# Patient Record
Sex: MALE | Race: WHITE | Employment: OTHER | URBAN - NONMETROPOLITAN AREA
[De-identification: names, ages, dates, MRNs, and addresses within clinical notes are randomized per-mention and may not be internally consistent; named-entity substitution may affect disease eponyms.]

---

## 2018-08-31 ENCOUNTER — TELEPHONE (OUTPATIENT)
Dept: FAMILY MEDICINE CLINIC | Age: 18
End: 2018-08-31

## 2018-09-04 ENCOUNTER — OFFICE VISIT (OUTPATIENT)
Dept: FAMILY MEDICINE CLINIC | Age: 18
End: 2018-09-04
Payer: COMMERCIAL

## 2018-09-04 VITALS
TEMPERATURE: 98.4 F | RESPIRATION RATE: 16 BRPM | HEART RATE: 80 BPM | DIASTOLIC BLOOD PRESSURE: 60 MMHG | WEIGHT: 172 LBS | HEIGHT: 71 IN | SYSTOLIC BLOOD PRESSURE: 122 MMHG | BODY MASS INDEX: 24.08 KG/M2

## 2018-09-04 DIAGNOSIS — F41.0 PANIC DISORDER: ICD-10-CM

## 2018-09-04 DIAGNOSIS — F41.9 ANXIETY: Primary | ICD-10-CM

## 2018-09-04 PROCEDURE — G8420 CALC BMI NORM PARAMETERS: HCPCS | Performed by: FAMILY MEDICINE

## 2018-09-04 PROCEDURE — 4004F PT TOBACCO SCREEN RCVD TLK: CPT | Performed by: FAMILY MEDICINE

## 2018-09-04 PROCEDURE — G8427 DOCREV CUR MEDS BY ELIG CLIN: HCPCS | Performed by: FAMILY MEDICINE

## 2018-09-04 PROCEDURE — 99203 OFFICE O/P NEW LOW 30 MIN: CPT | Performed by: FAMILY MEDICINE

## 2018-09-04 RX ORDER — HYDROXYZINE 50 MG/1
TABLET, FILM COATED ORAL
Qty: 60 TABLET | Refills: 1 | Status: ON HOLD | OUTPATIENT
Start: 2018-09-04 | End: 2018-10-03 | Stop reason: HOSPADM

## 2018-09-04 RX ORDER — VENLAFAXINE HYDROCHLORIDE 75 MG/1
75 CAPSULE, EXTENDED RELEASE ORAL DAILY
Qty: 30 CAPSULE | Refills: 3 | Status: ON HOLD | OUTPATIENT
Start: 2018-09-04 | End: 2018-10-03 | Stop reason: HOSPADM

## 2018-09-04 ASSESSMENT — PATIENT HEALTH QUESTIONNAIRE - PHQ9
SUM OF ALL RESPONSES TO PHQ9 QUESTIONS 1 & 2: 0
2. FEELING DOWN, DEPRESSED OR HOPELESS: 0
1. LITTLE INTEREST OR PLEASURE IN DOING THINGS: 0
SUM OF ALL RESPONSES TO PHQ QUESTIONS 1-9: 0
SUM OF ALL RESPONSES TO PHQ QUESTIONS 1-9: 0

## 2018-09-04 NOTE — PATIENT INSTRUCTIONS
usually wake up shortly afterward. · Always give yourself time to return to full alertness before you drive a car or do anything that might cause an accident if you are not fully alert. Never play a relaxation tape while you drive a car. When should you call for help? Call 911 anytime you think you may need emergency care. For example, call if:    · You feel you cannot stop from hurting yourself or someone else.   Rob Chandra the numbers for these national suicide hotlines: 5-508-916-TALK (0-557.944.9529) and 8-524-MLXSWYE (0-788.575.1476). If you or someone you know talks about suicide or feeling hopeless, get help right away.   Watch closely for changes in your health, and be sure to contact your doctor if:    · You have anxiety or fear that affects your life.     · You have symptoms of anxiety that are new or different from those you had before. Where can you learn more? Go to https://Network Vision.Synchroneuron. org and sign in to your UserZoom account. Enter P754 in the NoteVault box to learn more about \"Anxiety Disorder: Care Instructions. \"     If you do not have an account, please click on the \"Sign Up Now\" link. Current as of: December 7, 2017  Content Version: 11.7  © 6296-4418 Healthwise, Incorporated. Care instructions adapted under license by [facility] ([facility]). If you have questions about a medical condition or this instruction, always ask your healthcare professional. [place] [address] any warranty or liability for your use of this information. Patient Education        Panic Attacks: Care Instructions  Your Care Instructions    During a panic attack, you may have a feeling of intense fear or terror, trouble breathing, chest pain or tightness, heartbeat changes, dizziness, sweating, and shaking. A panic attack starts suddenly and usually lasts from 5 to 20 minutes but may last even longer. You have the most anxiety about 10 minutes after the attack starts.  An attack can begin with a stressful event, or it can happen without a cause. Although panic attacks can cause scary symptoms, you can learn to manage them with self-care, counseling, and medicine. Follow-up care is a key part of your treatment and safety. Be sure to make and go to all appointments, and call your doctor if you are having problems. It's also a good idea to know your test results and keep a list of the medicines you take. How can you care for yourself at home? · Take your medicine exactly as directed. Call your doctor if you think you are having a problem with your medicine. · Go to your counseling sessions and follow-up appointments. · Recognize and accept your anxiety. Then, when you are in a situation that makes you anxious, say to yourself, \"This is not an emergency. I feel uncomfortable, but I am not in danger. I can keep going even if I feel anxious. \"  · Be kind to your body:  ¨ Relieve tension with exercise or a massage. ¨ Get enough rest.  ¨ Avoid alcohol, caffeine, nicotine, and illegal drugs. They can increase your anxiety level, cause sleep problems, or trigger a panic attack. ¨ Learn and do relaxation techniques. See below for more about these techniques. · Engage your mind. Get out and do something you enjoy. Go to a funny movie, or take a walk or hike. Plan your day. Having too much or too little to do can make you anxious. · Keep a record of your symptoms. Discuss your fears with a good friend or family member, or join a support group for people with similar problems. Talking to others sometimes relieves stress. · Get involved in social groups, or volunteer to help others. Being alone sometimes makes things seem worse than they are. · Get at least 30 minutes of exercise on most days of the week to relieve stress. Walking is a good choice. You also may want to do other activities, such as running, swimming, cycling, or playing tennis or team sports.   Relaxation techniques  Do relaxation or different anxiety.     · You are not getting better as expected. Where can you learn more? Go to https://Boondpepiceweb.Vivid [email or web address] org and sign in to your pMDsoft account. Enter H601 in the Skiipi box to learn more about \"Panic Attacks: Care Instructions. \"     If you do not have an account, please click on the \"Sign Up Now\" link. Current as of: December 7, 2017  Content Version: 11.7  © 3746-3623 [employer], Incorporated. Care instructions adapted under license by [facility] [address] If you have questions about a medical condition or this instruction, always ask your healthcare professional. [address] any warranty or liability for your use of this information.

## 2018-09-04 NOTE — PROGRESS NOTES
Chief Complaint   Patient presents with   Sheela Copeland Doctor     former pt Dr Patricia Velasquez Other     Hx paniac  attacks   more frequent as of late. History obtained from the patient. SUBJECTIVE:  David Hampton is a 19 y.o. male that presents today for establishing care with new physician, etc. New patient, [facility] [address]. Anxiety: HPI: a year ago was dx with depression, anxiety and panic d/o. Was treated with zoloft, but made him groggy, so he stopped it after 4 months. Had been doing well for the most part until recently. 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 Denies any depressive sxs. But feeling more anxious of late and having on and off panic attacks. The last wk it's been nightly. Having trouble managing. Was seen at [facility] at one point, and was tried on vistaril, but that didn't really help much.     zolfot stopped in MAY  Had some SI before in [facility] of 2018, none since then.      Inciting events or triggers for anxiety - life/stress  Frequency of anxiety - daily  Panic attacks?: as above  Sleep Disturbances? : yes  Impaired concentration?: denies  Substance abuse?: denies  Suicidal/Homicidal Ideation?: denies       Age/Gender Health Maintenance    Lipid - age 26  DM Screen - age 26  Colon Cancer Screening - age 46  Lung Cancer Screening (Age 54 to [de-identified] with 27 pack year hx, current smoker or quit within past 13 years) - age 54 if meets criteria    Tetanus - UTD June 2011  Influenza Vaccine - candidate FALL 2018  Pneumonia Vaccine - age 70  Zostavax - age 46     PSA testing discussion - age 52  AAA Screening - age 70 if smoked    Falls screening - n/a      Current Outpatient Prescriptions   Medication Sig Dispense Refill    venlafaxine (EFFEXOR XR) 75 MG extended release capsule Take 1 capsule by mouth daily 30 capsule 3    hydrOXYzine (ATARAX) 50 MG tablet Take 1 to 2 tablets by mouth every 6 hours as needed for anxiety 60 tablet 1    acetaminophen (TYLENOL) 500 MG tablet Take 1,000 mg by mouth every 6 hours as needed.  multivitamin (THERAGRAN) per tablet Take 1 tablet by mouth daily. No current facility-administered medications for this visit. Orders Placed This Encounter   Medications    venlafaxine (EFFEXOR XR) 75 MG extended release capsule     Sig: Take 1 capsule by mouth daily     Dispense:  30 capsule     Refill:  3    hydrOXYzine (ATARAX) 50 MG tablet     Sig: Take 1 to 2 tablets by mouth every 6 hours as needed for anxiety     Dispense:  60 tablet     Refill:  1         All medications reviewed and reconciled, including OTC and herbal medications. Updated list given to patient. Patient Active Problem List    Diagnosis Date Noted    Anxiety 09/04/2018    Panic disorder 09/04/2018    History of depression        Past Medical History:   Diagnosis Date    Anxiety     History of depression          Past Surgical History:   Procedure Laterality Date    HARDWARE REMOVAL Right 11/27/2013    Knee    TIBIA FRACTURE SURGERY  3/28/2013         No Known Allergies      Social History     Social History    Marital status: Single     Spouse name: N/A    Number of children: N/A    Years of education: N/A     Occupational History    Not on file.      Social History Main Topics    Smoking status: Current Every Day Smoker     Packs/day: 0.25     Years: 2.00     Types: Cigarettes    Smokeless tobacco: Never Used    Alcohol use Yes      Comment: 8  weekly     Drug use: No      Comment: smoked  pot in  past  but quiet     Sexual activity: Yes     Other Topics Concern    Not on file     Social History Narrative    No narrative on file         Family History   Problem Relation Age of Onset    Diabetes Father         type 1    Cancer Father         Stomach cancer as an infant   [facility] Breast [facility] Mother 62    Other Mother         RAMA     Colon Cancer Neg Hx     Prostate Cancer Neg Hx          I

## 2018-09-06 ENCOUNTER — TELEPHONE (OUTPATIENT)
Dept: FAMILY MEDICINE CLINIC | Age: 18
End: 2018-09-06

## 2018-09-06 NOTE — TELEPHONE ENCOUNTER
Patients mom calling, she is on hipaa. Patient had intercourse with someone who has herpes. He does not have any symptoms. Can he be tested?

## 2018-09-07 NOTE — TELEPHONE ENCOUNTER
Khurram Schilling, if he's not having any symptoms, then I don't think I'd recommend any testing for genital herpes. But would recommend routine STD screening for other diseases that could go with genital herpes. If ok with that, will order labs. Let me know if questions, thanks!

## 2018-09-07 NOTE — TELEPHONE ENCOUNTER
Spoke to pt's mom and she said that she will discuss this with Ronny Bob and get back with us for an answer.

## 2018-09-29 ENCOUNTER — HOSPITAL ENCOUNTER (INPATIENT)
Age: 18
LOS: 4 days | Discharge: HOME | DRG: 885 | End: 2018-10-03
Attending: FAMILY MEDICINE | Admitting: PSYCHIATRY & NEUROLOGY
Payer: COMMERCIAL

## 2018-09-29 DIAGNOSIS — F19.94 SUBSTANCE INDUCED MOOD DISORDER (HCC): Primary | ICD-10-CM

## 2018-09-29 LAB
ACETAMINOPHEN LEVEL: < 5 UG/ML (ref 0–20)
ALBUMIN SERPL-MCNC: 5.7 G/DL (ref 3.5–5.1)
ALP BLD-CCNC: 74 U/L (ref 38–126)
ALT SERPL-CCNC: 35 U/L (ref 11–66)
AMPHETAMINE+METHAMPHETAMINE URINE SCREEN: POSITIVE
ANION GAP SERPL CALCULATED.3IONS-SCNC: 16 MEQ/L (ref 8–16)
AST SERPL-CCNC: 28 U/L (ref 5–40)
BARBITURATE QUANTITATIVE URINE: NEGATIVE
BASOPHILS # BLD: 0.7 %
BASOPHILS ABSOLUTE: 0 THOU/MM3 (ref 0–0.1)
BENZODIAZEPINE QUANTITATIVE URINE: NEGATIVE
BILIRUB SERPL-MCNC: 2 MG/DL (ref 0.3–1.2)
BILIRUBIN DIRECT: 0.3 MG/DL (ref 0–0.3)
BILIRUBIN URINE: NEGATIVE
BLOOD, URINE: NEGATIVE
BUN BLDV-MCNC: 21 MG/DL (ref 7–22)
CALCIUM SERPL-MCNC: 10.4 MG/DL (ref 8.5–10.5)
CANNABINOID QUANTITATIVE URINE: NEGATIVE
CHARACTER, URINE: CLEAR
CHLORIDE BLD-SCNC: 97 MEQ/L (ref 98–111)
CO2: 26 MEQ/L (ref 23–33)
COCAINE METABOLITE QUANTITATIVE URINE: NEGATIVE
COLOR: YELLOW
CREAT SERPL-MCNC: 1 MG/DL (ref 0.4–1.2)
EOSINOPHIL # BLD: 2.7 %
EOSINOPHILS ABSOLUTE: 0.2 THOU/MM3 (ref 0–0.4)
ERYTHROCYTE [DISTWIDTH] IN BLOOD BY AUTOMATED COUNT: 12.8 % (ref 11.5–14.5)
ERYTHROCYTE [DISTWIDTH] IN BLOOD BY AUTOMATED COUNT: 41.1 FL (ref 35–45)
ETHYL ALCOHOL, SERUM: < 0.01 %
GFR SERPL CREATININE-BSD FRML MDRD: > 90 ML/MIN/1.73M2
GLUCOSE BLD-MCNC: 88 MG/DL (ref 70–108)
GLUCOSE URINE: NEGATIVE MG/DL
HCT VFR BLD CALC: 49.1 % (ref 42–52)
HEMOGLOBIN: 17.6 GM/DL (ref 14–18)
IMMATURE GRANS (ABS): 0.02 THOU/MM3 (ref 0–0.07)
IMMATURE GRANULOCYTES: 0.4 %
KETONES, URINE: 15
LEUKOCYTE ESTERASE, URINE: NEGATIVE
LYMPHOCYTES # BLD: 33.3 %
LYMPHOCYTES ABSOLUTE: 1.9 THOU/MM3 (ref 1–4.8)
MCH RBC QN AUTO: 31.4 PG (ref 26–33)
MCHC RBC AUTO-ENTMCNC: 35.8 GM/DL (ref 32.2–35.5)
MCV RBC AUTO: 87.7 FL (ref 80–94)
MONOCYTES # BLD: 9.7 %
MONOCYTES ABSOLUTE: 0.5 THOU/MM3 (ref 0.4–1.3)
NITRITE, URINE: NEGATIVE
NUCLEATED RED BLOOD CELLS: 0 /100 WBC
OPIATES, URINE: NEGATIVE
OSMOLALITY CALCULATION: 279.9 MOSMOL/KG (ref 275–300)
OXYCODONE: NEGATIVE
PH UA: 5.5
PHENCYCLIDINE QUANTITATIVE URINE: NEGATIVE
PLATELET # BLD: 265 THOU/MM3 (ref 130–400)
PMV BLD AUTO: 10 FL (ref 9.4–12.4)
POTASSIUM SERPL-SCNC: 4.8 MEQ/L (ref 3.5–5.2)
PROTEIN UA: NEGATIVE
RBC # BLD: 5.6 MILL/MM3 (ref 4.7–6.1)
SALICYLATE, SERUM: < 0.3 MG/DL (ref 2–10)
SEG NEUTROPHILS: 53.2 %
SEGMENTED NEUTROPHILS ABSOLUTE COUNT: 3 THOU/MM3 (ref 1.8–7.7)
SODIUM BLD-SCNC: 139 MEQ/L (ref 135–145)
SPECIFIC GRAVITY, URINE: 1.02 (ref 1–1.03)
TOTAL PROTEIN: 7.9 G/DL (ref 6.1–8)
TSH SERPL DL<=0.05 MIU/L-ACNC: 0.8 UIU/ML (ref 0.4–4.2)
UROBILINOGEN, URINE: 0.2 EU/DL
WBC # BLD: 5.6 THOU/MM3 (ref 4.8–10.8)

## 2018-09-29 PROCEDURE — 80053 COMPREHEN METABOLIC PANEL: CPT

## 2018-09-29 PROCEDURE — 82248 BILIRUBIN DIRECT: CPT

## 2018-09-29 PROCEDURE — 81003 URINALYSIS AUTO W/O SCOPE: CPT

## 2018-09-29 PROCEDURE — 99284 EMERGENCY DEPT VISIT MOD MDM: CPT

## 2018-09-29 PROCEDURE — 6370000000 HC RX 637 (ALT 250 FOR IP): Performed by: PSYCHIATRY & NEUROLOGY

## 2018-09-29 PROCEDURE — 80307 DRUG TEST PRSMV CHEM ANLYZR: CPT

## 2018-09-29 PROCEDURE — G0480 DRUG TEST DEF 1-7 CLASSES: HCPCS

## 2018-09-29 PROCEDURE — 85025 COMPLETE CBC W/AUTO DIFF WBC: CPT

## 2018-09-29 PROCEDURE — 1240000000 HC EMOTIONAL WELLNESS R&B

## 2018-09-29 PROCEDURE — 84443 ASSAY THYROID STIM HORMONE: CPT

## 2018-09-29 PROCEDURE — 36415 COLL VENOUS BLD VENIPUNCTURE: CPT

## 2018-09-29 RX ORDER — NICOTINE 21 MG/24HR
1 PATCH, TRANSDERMAL 24 HOURS TRANSDERMAL DAILY
Status: DISCONTINUED | OUTPATIENT
Start: 2018-09-30 | End: 2018-09-30

## 2018-09-29 RX ORDER — ACETAMINOPHEN 325 MG/1
650 TABLET ORAL EVERY 4 HOURS PRN
Status: DISCONTINUED | OUTPATIENT
Start: 2018-09-29 | End: 2018-10-03 | Stop reason: HOSPADM

## 2018-09-29 RX ORDER — MAGNESIUM HYDROXIDE/ALUMINUM HYDROXICE/SIMETHICONE 120; 1200; 1200 MG/30ML; MG/30ML; MG/30ML
30 SUSPENSION ORAL PRN
Status: DISCONTINUED | OUTPATIENT
Start: 2018-09-29 | End: 2018-10-03 | Stop reason: HOSPADM

## 2018-09-29 RX ORDER — TRAZODONE HYDROCHLORIDE 50 MG/1
50 TABLET ORAL NIGHTLY PRN
Status: DISCONTINUED | OUTPATIENT
Start: 2018-09-29 | End: 2018-10-03 | Stop reason: HOSPADM

## 2018-09-29 RX ORDER — IBUPROFEN 400 MG/1
400 TABLET ORAL EVERY 6 HOURS PRN
Status: DISCONTINUED | OUTPATIENT
Start: 2018-09-29 | End: 2018-10-03 | Stop reason: HOSPADM

## 2018-09-29 RX ORDER — HYDROXYZINE PAMOATE 25 MG/1
25 CAPSULE ORAL 3 TIMES DAILY PRN
Status: DISCONTINUED | OUTPATIENT
Start: 2018-09-29 | End: 2018-10-03 | Stop reason: HOSPADM

## 2018-09-29 RX ORDER — TRAZODONE HYDROCHLORIDE 50 MG/1
50 TABLET ORAL NIGHTLY PRN
Status: DISCONTINUED | OUTPATIENT
Start: 2018-09-30 | End: 2018-09-29

## 2018-09-29 RX ADMIN — TRAZODONE HYDROCHLORIDE 50 MG: 50 TABLET ORAL at 23:16

## 2018-09-29 RX ADMIN — HYDROXYZINE PAMOATE 25 MG: 25 CAPSULE ORAL at 23:16

## 2018-09-29 ASSESSMENT — ENCOUNTER SYMPTOMS
DIARRHEA: 0
COLOR CHANGE: 0
ABDOMINAL PAIN: 0
VOMITING: 0
BACK PAIN: 0
SORE THROAT: 0
COUGH: 0

## 2018-09-29 ASSESSMENT — SLEEP AND FATIGUE QUESTIONNAIRES
DIFFICULTY STAYING ASLEEP: YES
DO YOU HAVE DIFFICULTY SLEEPING: YES
SLEEP PATTERN: INSOMNIA;DIFFICULTY FALLING ASLEEP;DISTURBED/INTERRUPTED SLEEP
AVERAGE NUMBER OF SLEEP HOURS: 6
RESTFUL SLEEP: NO
DIFFICULTY ARISING: YES
SLEEP PATTERN: INSOMNIA;DIFFICULTY FALLING ASLEEP;DISTURBED/INTERRUPTED SLEEP
DO YOU USE A SLEEP AID: NO
AVERAGE NUMBER OF SLEEP HOURS: 6
DO YOU HAVE DIFFICULTY SLEEPING: YES
DO YOU USE A SLEEP AID: NO
DIFFICULTY STAYING ASLEEP: YES
DIFFICULTY ARISING: YES
DIFFICULTY FALLING ASLEEP: YES
DIFFICULTY FALLING ASLEEP: YES
RESTFUL SLEEP: NO

## 2018-09-29 ASSESSMENT — LIFESTYLE VARIABLES
HISTORY_ALCOHOL_USE: YES
HISTORY_ALCOHOL_USE: YES

## 2018-09-29 ASSESSMENT — PAIN SCALES - GENERAL: PAINLEVEL_OUTOF10: 0

## 2018-09-29 ASSESSMENT — PATIENT HEALTH QUESTIONNAIRE - PHQ9
SUM OF ALL RESPONSES TO PHQ QUESTIONS 1-9: 17
SUM OF ALL RESPONSES TO PHQ QUESTIONS 1-9: 17

## 2018-09-29 NOTE — ED PROVIDER NOTES
[facility]  eMERGENCY dEPARTMENT eNCOUnter          CHIEF COMPLAINT       Chief Complaint   Patient presents with    Suicidal    Psychiatric Evaluation       Nurses Notes reviewed and I agree except as noted in the HPI. HISTORY OF PRESENT ILLNESS    Danny Pearl is a 19 y.o. male who presents to the Emergency Department for the evaluation of Mental health. Patient states that for the past 3 months he has been \"testing the [place] to see if I am who they say that I am.\"  He states that [facility] world thinks Anthony. \"  He states he saw Dr. Dunaway Or a few weeks ago but was not honest with him about his symptoms. He has not been taking the prescribed Effexor but has tried the Atarax for his anxiety. He reports frequent drug use. He uses vitamins on a regular basis, last use yesterday morning. He notices his psychiatric symptoms persist even if he stops using. He does note that he has used anything he can get his hands on in the past.  He reports suicidal attempt  By slitting his wrist 2-3 weeks ago but couldn't tolerate the pain. He's been having ideations for a \"very long time. \"He denies any homicidal ideations. He does report that he sees the slowest soles and that when he looks in the mirror at night a dark figure with one eye approaches him and he believes it is [facility]. Denies any physical concerns or complaints today, other than occasional fleeting pains that occur and are caused by others when they become angry with the things that he says. The HPI was provided by the patient. REVIEW OF SYSTEMS     Review of Systems   Constitutional: Negative for chills and fever. HENT: Negative for sore throat. Eyes: Negative for visual disturbance. Respiratory: Negative for cough. Cardiovascular: Negative for chest pain. Gastrointestinal: Negative for abdominal pain, diarrhea and vomiting. Genitourinary: Negative for dysuria.    Musculoskeletal: Negative for back

## 2018-09-30 LAB
EKG ATRIAL RATE: 77 BPM
EKG P AXIS: 62 DEGREES
EKG P-R INTERVAL: 150 MS
EKG Q-T INTERVAL: 354 MS
EKG QRS DURATION: 106 MS
EKG QTC CALCULATION (BAZETT): 400 MS
EKG R AXIS: 87 DEGREES
EKG T AXIS: 62 DEGREES
EKG VENTRICULAR RATE: 77 BPM

## 2018-09-30 PROCEDURE — 6370000000 HC RX 637 (ALT 250 FOR IP): Performed by: NURSE PRACTITIONER

## 2018-09-30 PROCEDURE — 93010 ELECTROCARDIOGRAM REPORT: CPT | Performed by: INTERNAL MEDICINE

## 2018-09-30 PROCEDURE — 1240000000 HC EMOTIONAL WELLNESS R&B

## 2018-09-30 PROCEDURE — 93005 ELECTROCARDIOGRAM TRACING: CPT | Performed by: NURSE PRACTITIONER

## 2018-09-30 PROCEDURE — 6370000000 HC RX 637 (ALT 250 FOR IP): Performed by: PSYCHIATRY & NEUROLOGY

## 2018-09-30 PROCEDURE — 90792 PSYCH DIAG EVAL W/MED SRVCS: CPT | Performed by: NURSE PRACTITIONER

## 2018-09-30 RX ORDER — NICOTINE 21 MG/24HR
1 PATCH, TRANSDERMAL 24 HOURS TRANSDERMAL DAILY
Status: DISCONTINUED | OUTPATIENT
Start: 2018-09-30 | End: 2018-10-03 | Stop reason: HOSPADM

## 2018-09-30 RX ORDER — CITALOPRAM 20 MG/1
20 TABLET ORAL DAILY
Status: DISCONTINUED | OUTPATIENT
Start: 2018-09-30 | End: 2018-09-30

## 2018-09-30 RX ORDER — DIVALPROEX SODIUM 250 MG/1
250 TABLET, EXTENDED RELEASE ORAL DAILY
Status: DISCONTINUED | OUTPATIENT
Start: 2018-09-30 | End: 2018-10-02

## 2018-09-30 RX ORDER — DULOXETIN HYDROCHLORIDE 20 MG/1
40 CAPSULE, DELAYED RELEASE ORAL DAILY
Status: DISCONTINUED | OUTPATIENT
Start: 2018-09-30 | End: 2018-10-02

## 2018-09-30 RX ORDER — DIVALPROEX SODIUM 500 MG/1
500 TABLET, EXTENDED RELEASE ORAL NIGHTLY
Status: DISCONTINUED | OUTPATIENT
Start: 2018-09-30 | End: 2018-09-30

## 2018-09-30 RX ORDER — QUETIAPINE FUMARATE 25 MG/1
50 TABLET, FILM COATED ORAL 2 TIMES DAILY
Status: DISCONTINUED | OUTPATIENT
Start: 2018-09-30 | End: 2018-10-01

## 2018-09-30 RX ADMIN — DULOXETINE HYDROCHLORIDE 40 MG: 20 CAPSULE, DELAYED RELEASE ORAL at 10:41

## 2018-09-30 RX ADMIN — QUETIAPINE FUMARATE 50 MG: 25 TABLET ORAL at 20:36

## 2018-09-30 RX ADMIN — DIVALPROEX SODIUM 250 MG: 250 TABLET, EXTENDED RELEASE ORAL at 09:56

## 2018-09-30 RX ADMIN — QUETIAPINE FUMARATE 50 MG: 25 TABLET ORAL at 10:41

## 2018-09-30 RX ADMIN — HYDROXYZINE PAMOATE 25 MG: 25 CAPSULE ORAL at 20:37

## 2018-09-30 ASSESSMENT — PAIN SCALES - GENERAL: PAINLEVEL_OUTOF10: 0

## 2018-09-30 NOTE — PROGRESS NOTES
Behavioral Health   Admission Note     Admission Type:   Admission Type: Involuntary    Reason for admission:  Reason for Admission: suicidal ideations     PATIENT STRENGTHS:  Strengths: Communication, No significant Physical Illness, Positive Support    Patient Strengths and Limitations:  Limitations: Difficulty problem solving/relies on others to help solve problems, Inappropriate/potentially harmful leisure interests    Addictive Behavior:   Addictive Behavior  In the past 3 months, have you felt or has someone told you that you have a problem with:  : Eating (too much/too little), Sex/Pornography, Internet Use  Do you have a history of Chemical Use?: No  Do you have a history of Alcohol Use?: Yes  Do you have a history of Street Drug Abuse?: Yes  Histroy of Prescripton Drug Abuse?: Yes    Medical Problems:   Past Medical History:   Diagnosis Date    Anxiety     History of depression        Status EXAM:  Status and Exam  Normal: No  Facial Expression: Exaggerated, Brightened  Affect: Stable  Level of Consciousness: Alert  Mood:Normal: No  Mood: Anxious, Euphoric  Motor Activity:Normal: Yes  Motor Activity: Increased  Interview Behavior: Cooperative  Preception: Notrees to Person, Notrees to Time, Notrees to Place, Notrees to Situation  Attention:Normal: No  Attention: Distractible, Unable to Concentrate  Thought Processes: Circumstantial  Thought Content:Normal: No  Thought Content: Delusions, Preoccupations  Hallucinations: Visual (Comment), Auditory (Comment) (see's body's and souls when its dark .  multiple voices at a time but states tunes them out when he wants )  Delusions: Yes  Delusions: Grandeur  Memory:Normal: No  Memory: Poor Recent  Insight and Judgment: No  Insight and Judgment: Poor Judgment, Poor Insight  Present Suicidal Ideation: No  Present Homicidal Ideation: No    Pt admitted with followings belongings:  Dentures: None  Vision - Corrective Lenses: Glasses  Hearing Aid: None  Jewelry: None  Body Piercings Removed: N/A  Clothing: Footwear, Pants, Shirt, Socks, Undergarments (Comment), Other (Comment) (belt)  Were All Patient Medications Collected?: Not Applicable  Other Valuables: Cell phone, Money (Comment), Wallet, Other (Comment) ($3.00; ID and muliple cards in wallet;)     Admission order obtained YES. Valuables placed in safe in security envelope, number:  [id number]. Patient oriented to surroundings and program expectations and copy of patient rights given. Received admission packet:  yes. Consents reviewed, signed yes. Patient verbalize understanding:  yes. Patient education on precautions: yes           Provided pt with [employer] handout entitled \"Quitting Smoking. \"  Reviewed handout with pt addressing dangers of smoking, developing coping skills, and providing basic information about quitting. Pt response to counseling:  Does not verbalize interest at this time. Pt arrived on unit via campus police and ED staff. Alert and oriented x 4. States he is here because he has been having suicidal ideations from withdrawaling from meth. Pt states he uses any drugs he can get. Denies depression. Complained of anxiety. Not sleeping or eating x 3 days. States he is having auditory hallucinations states hears multiple voices and they are just holding a conversation. States he can turn auditory hallucinations on and off when needed. State he is having visual hallucinations see's body and souls when he is in the dark. States he can see different dimensions of the world. Denies suicidal ideations while on unit. Denies homicidal ideations. States he was prescribed Effexor and Atarax by Dr Rj Porter but did not start the Effexor.                Tushar Segura, RN

## 2018-09-30 NOTE — PROGRESS NOTES
[facility] Biopsychosocial Assessment    Current Level of Psychosocial Functioning     Independent xxx  Dependent    Minimal Assist     Comments:      Psychosocial High Risk Factors (check all that apply)    Unable to obtain meds   Chronic illness/pain    Substance abuse xxx  Lack of Family Support   Financial stress   Isolation   Inadequate Community Resources  Suicide attempt(s) xxx  Not taking medications xxx  Victim of crime   Developmental Delay  Unable to manage personal needs    Age 72 or older   Homeless  No transportation   Readmission within 30 days   Unemployment  Traumatic Event    Comments:   Sexual Orientation:      Patient Strengths: strong family support    Patient Barriers: substance abuse    Plan of Care     medication management, group/individual therapies, family meetings, psycho -education, treatment team meetings to assist with stabilization    Initial Discharge Plan:  Patient will resume care with [facility] and return to his home upon discharge. Clinical Summary:  Patient is a 19year old male who came to the ED due to suicidal thinking. Patient has a history of depression, but is not currently taking medication prescribed. Patient reports medication has not been helpful. Patient reports substance abuse to self-medicate including methamphetamines, marijuana and \"everything\". 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

## 2018-09-30 NOTE — H&P
Psychiatry H&P             6-              CC: Bipolar II D/O; Polysubstance abuse: Substance induced psychosis     HPI:  Patient is a 19 y.o., single,  male, that currently resides with his parents. Patient was brought to the ED by his mother for evaluation of suicidal thoughts. Patient reports that he feels that it was his \"psychosis\" that caused him to have suicidal thoughts and he also has \"drugs in [his] life. \"  Patient feels that it was because of the drugs that he had a \"psychotic episode. \"  Patient reports that his \"psychotic episode\" consisted of feelings of paranoia and he reports that he usually feels like he is the devil. He stated that these episodes have started when he started using amphetamines. Patient reports that he didn't eat or drink the past three days and has not had any sleep until last night. He had not slept in 36 hours. He reports that he got about 10 hours of sleep last night. He last used amphetamines on Friday morning (crystal meth). He also reports that he has not been taking his medications. Patient reports that he started having the suicidal thoughts today. Patient reports that he had a plan to \"just do it wherever. However, the easiest way is to kill myself. \"  Patient denies having these thoughts now. Patient reports that he has the suicidal thoughts whenever he uses drugs. Patient reports poor sleep and at times sleeping too much. He also reports problems with isolating himself from other people. He has not been taking his medications for the last 3 days.      Patient is alert and is oriented x4. Patient denies any hallucinations currently, but does report feeling that he is the devil at times. Patient speech is clear and of normal volume and aric. Patient eye contact is good. Patient's thoughts are circumstantial in nature. Upon admission to E4 Psychiatric Unit:  Manda Mood reports having mood swings and depression.  Reports had some psychosis but believes it was due to drug use. Denies feelings of harm towards self or others. Reports desming with mood swings and depression for years off and on. Reports had issues with sleep but verified slept 6 hours continuous. Reports appetite is good. Reports drug use with cannabis, Meth and Hydrocodone. Labs reviewed drawn 9-29-18 reflect no critical abnormals. Urine tox is positive for meth. Receptive for trial of Depakote and Celexa. Past Medical Hx:  See ED note    Past Psychiatric Hx:  Denies any past psychiatric hospitalizations. [facility]. Reports being seen by Carey Kam CNP at [facility]. Past psych meds. Zoloft, Vistaril. Rxed Effexor but has not has not taken . Family Hx:  Reports mother has depression. Believes father has alcoholism. Social Hx:  TOBACCO: Reports being 10 cigarettes a day smoker. ETOH:  Reports drinks 3 12 oz beers weekly. DRUGS: Reports use of meth uses once weekly. Reports cannabis use daily. Reports last used Hydrocodone 2 weeks ago. MARITAL STATUS: Never . 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 LIVING SITUATION: Lives with parents in [place], [place].  Denies having any children   LEGAL: Reports one arrest at 10years old for possession of cannabis    MSE:  Level of consciousness: Alert  Appearance: in chair and fair grooming   Behavior/Motor: no abnormalities noted   Attitude toward examiner: cooperative   Speech: Normal volume, goal directed, NRR  Mood: Euthymic  Affect: Reactive  Thought processes: Linear and goal directed   Suicidal Ideation: Denies suicidal ideations  Homicidal ideation: Denies homicidal ideations  Delusions: No evidence of delusions is observed  Perceptual Disturbance: Denies AH/VH;  No evidence of psychosis is observed. Cognition: Oriented to person, place, time and situation   Concentration fair   Memory intact   Insight: Limited  Judgment: Limited    ROS:  Constitutional: Negative for fever, chills and fatigue. HENT: Negative for ear pain, congestion, rhinorrhea and neck pain. Eyes: Negative for pain and discharge. Respiratory: Negative for cough, chest tightness and wheezing. Cardiovascular: Negative for chest pain, palpitations and leg swelling. Gastrointestinal: Negative for nausea, vomiting and diarrhea. Genitourinary: Negative for dysuria and frequency. Musculoskeletal: Negative for myalgias, back pain and arthralgias. Skin: Negative for rash. Neurological: Negative for dizziness, weakness and headaches. Impression:  MDD with psychotic features  Bipolar II D/O   Polysubstance Abuse  Substance induced psychosis    Plan:  Admit to [facility] psychiatric unit for symptom stabilization and medication management. Introduce to unit milieu, groups and individual therapies. Start Celexa 20mg po q am  Depkaote ER  250mg po q am and 500mg po q hs  EKG to be done today        [employer] Certification     Admission Day 1  I certify that this patient's inpatient psychiatric hospital admission is medically necessary for:    (1) treatment which could reasonably be expected to improve this patient's condition, or    (2) diagnostic study or its equivalent. Physicians Signature: Electronically signed by Gilda Estevez CNP 2-  I assessed this patient and reviewed the case and plan of care with Gilda Estevez CNP.   I have reviewed the above documentation and I agree with the findings and treatment plan as written      I saw this patient today with the treatment team on the unit  Present:  , Dr. Brendon Dickson  Objective:  Treatment goals were discussed  Treatment options including medications and therapy session were discussed with the patient    Patientagreed and

## 2018-10-01 PROCEDURE — 6370000000 HC RX 637 (ALT 250 FOR IP): Performed by: PSYCHIATRY & NEUROLOGY

## 2018-10-01 PROCEDURE — 6370000000 HC RX 637 (ALT 250 FOR IP): Performed by: NURSE PRACTITIONER

## 2018-10-01 PROCEDURE — 1240000000 HC EMOTIONAL WELLNESS R&B

## 2018-10-01 PROCEDURE — 99232 SBSQ HOSP IP/OBS MODERATE 35: CPT | Performed by: PSYCHIATRY & NEUROLOGY

## 2018-10-01 RX ORDER — QUETIAPINE FUMARATE 25 MG/1
50 TABLET, FILM COATED ORAL NIGHTLY
Status: DISCONTINUED | OUTPATIENT
Start: 2018-10-01 | End: 2018-10-02

## 2018-10-01 RX ORDER — QUETIAPINE FUMARATE 25 MG/1
25 TABLET, FILM COATED ORAL ONCE
Status: COMPLETED | OUTPATIENT
Start: 2018-10-01 | End: 2018-10-01

## 2018-10-01 RX ADMIN — DULOXETINE HYDROCHLORIDE 40 MG: 20 CAPSULE, DELAYED RELEASE ORAL at 08:20

## 2018-10-01 RX ADMIN — DIVALPROEX SODIUM 250 MG: 250 TABLET, EXTENDED RELEASE ORAL at 08:20

## 2018-10-01 RX ADMIN — QUETIAPINE FUMARATE 50 MG: 25 TABLET ORAL at 20:27

## 2018-10-01 RX ADMIN — QUETIAPINE FUMARATE 25 MG: 25 TABLET ORAL at 15:55

## 2018-10-01 RX ADMIN — HYDROXYZINE PAMOATE 25 MG: 25 CAPSULE ORAL at 15:05

## 2018-10-01 ASSESSMENT — PAIN SCALES - GENERAL: PAINLEVEL_OUTOF10: 0

## 2018-10-01 NOTE — PROGRESS NOTES
Nutrition Assessment    Type and Reason for Visit: Initial, Positive Nutrition Screen (weight loss, poor appetite)    Malnutrition Assessment:  · Malnutrition Status: No malnutrition    Nutrition Diagnosis:   · Problem: No nutrition diagnosis at this time    Nutrition Assessment:  · Subjective Assessment: Patient admit with suicidal ideation, history of polysubtance abuse. Patient seen- reports having good appetite now, states did not eat anything for 3 days prior to admission due to drug use, report since being in hospital has been eating well, denies nausea or abdominal pain, stools have been regular. Note intake of % of meals on general diet. Reports usual weight 165# but varies some. Current weight 164# (9/29/18, actual) with BMI: 23. Discussed ONS available- patient declines at this time. Nutrition Risk Level   Risk Level: Low    Nutrition Intervention  Food and/or Delivery: Continue current diet  Nutrition Education/Counseling/Coordination of Care:  Continued Inpatient Monitoring, Coordination of Care, Education Initiated (encouraged continue good oral intake)    Patient assessed for nutrition risk. Deemed to be at low risk at this time. Will continue to follow patient.       Electronically signed by John Brown RD, CHUYITA on 10/1/18 at 1:27 PM    Contact Number: [phone]

## 2018-10-01 NOTE — PROGRESS NOTES
magnesium hydroxide-simethicone (MAALOX) 200-200-20 MG/5ML suspension 30 mL, 30 mL, Oral, PRN  ibuprofen (ADVIL;MOTRIN) tablet 400 mg, 400 mg, Oral, Q6H PRN  traZODone (DESYREL) tablet 50 mg, 50 mg, Oral, Nightly PRN    ASSESSMENT   <principal problem not specified>     PLAN    1. Continue :Cymbalta 40 mg daily and Seroquel 50 mg Qhs. Medication adjustments: Discontinue Serquel 50 mg am dose. 2.  Encouraged to participate in group activity   3. Refer to substance abuse assessment and treatment  4.  to look into placement   5.   Consider for discharge in 1-2 days        Time Spent: 34 minutes      Electronically signed by Marvin Phillip MD on 10/1/18 at 12:15 PM

## 2018-10-02 PROCEDURE — 99231 SBSQ HOSP IP/OBS SF/LOW 25: CPT | Performed by: PSYCHIATRY & NEUROLOGY

## 2018-10-02 PROCEDURE — 6370000000 HC RX 637 (ALT 250 FOR IP): Performed by: PSYCHIATRY & NEUROLOGY

## 2018-10-02 PROCEDURE — 1240000000 HC EMOTIONAL WELLNESS R&B

## 2018-10-02 PROCEDURE — 90833 PSYTX W PT W E/M 30 MIN: CPT | Performed by: PSYCHIATRY & NEUROLOGY

## 2018-10-02 RX ORDER — QUETIAPINE FUMARATE 25 MG/1
12.5 TABLET, FILM COATED ORAL NIGHTLY
Status: DISCONTINUED | OUTPATIENT
Start: 2018-10-02 | End: 2018-10-03 | Stop reason: HOSPADM

## 2018-10-02 RX ORDER — DULOXETIN HYDROCHLORIDE 60 MG/1
60 CAPSULE, DELAYED RELEASE ORAL DAILY
Status: DISCONTINUED | OUTPATIENT
Start: 2018-10-02 | End: 2018-10-03 | Stop reason: HOSPADM

## 2018-10-02 RX ADMIN — TRAZODONE HYDROCHLORIDE 50 MG: 50 TABLET ORAL at 20:30

## 2018-10-02 RX ADMIN — QUETIAPINE FUMARATE 12.5 MG: 25 TABLET ORAL at 20:29

## 2018-10-02 RX ADMIN — DULOXETINE HYDROCHLORIDE 60 MG: 60 CAPSULE, DELAYED RELEASE ORAL at 09:44

## 2018-10-02 ASSESSMENT — PAIN SCALES - GENERAL
PAINLEVEL_OUTOF10: 0
PAINLEVEL_OUTOF10: 0

## 2018-10-02 NOTE — BH NOTE
INPATIENT RECREATIONAL THERAPY  ADULT BEHAVIORAL SERVICES  EVALUATION    REFERRING PHYSICIAN:   Dr. Lisette Sprague  DIAGNOSIS:   Substance Induced Mood Disorder  PRECAUTIONS:   Suicide precautions    HISTORY OF PRESENT ILLNESS/INJURY:  Patient was admitted to the unit due to suicidal ideation and hallucinations. Patient reported that he has also not been sleeping, not taking his medications and abusing substances prior to admission. 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 Patient reported paranoia, poor sleep, anxiety and some delusional thoughts. Patient was pleasant and cooperative at time of evaluation. PMH:  Please see medical chart for prior medical history, allergies, and medication    HISTORY OF PSYCHIATRIC TREATMENT:  OP: Dr. Yary Chase:   [birth year]  GENDER:   male  MARITAL STATUS:   single    EMPLOYMENT STATUS:   - works with his father. LIVING SITUATION/SUPPORT:  Lives with his parents. EDUCATIONAL LEVEL:   graduate    MEDICATION/DRUG USE:  Polysubstance abuse. Methamphetamines. Marijuana. Alcohol. Pain medications. Overdose in the past.   Noncompliance with his medications. LEISURE INTERESTS:   Outdoor activities, sports (used to run track), listening to music, watching TV/Movies, activities with family, activities with friends  ACTIVITY PREFERENCE:  Small group  ACTIVITY TYPES:   Passive. Active. Indoor. Outdoor. COGNITION:  A&Ox4    COPING:  poor  ATTENTION:  Poor   RELAXATION:  Reported some paranoia, poor sleep, anxiety and delusional thoughts. SELF-ESTEEM:  Poor  MOTIVATION:   good    SOCIAL SKILLS:   good  FRUSTRATION TOLERANCE:   No history of violence noted. Patient does have a history of cutting.    ATTENTION SEEKING:  History of cutting  COOPERATION:  Pleasant and cooperative  AFFECT:  Brightens with interaction  APPEARANCE:  appropriate     HEARING:   No problems  VISION:  Corrected with

## 2018-10-02 NOTE — PATIENT CARE CONFERENCE
to pay for your medications? - Yes      4. How is your group participation?     - Yes    GOALS UPDATE:   Time frame for Short-Term Goals: Daily      RACHAEL Lockhart

## 2018-10-03 ENCOUNTER — TELEPHONE (OUTPATIENT)
Dept: FAMILY MEDICINE CLINIC | Age: 18
End: 2018-10-03

## 2018-10-03 VITALS
DIASTOLIC BLOOD PRESSURE: 84 MMHG | BODY MASS INDEX: 22.96 KG/M2 | WEIGHT: 164 LBS | SYSTOLIC BLOOD PRESSURE: 138 MMHG | HEART RATE: 79 BPM | OXYGEN SATURATION: 100 % | RESPIRATION RATE: 16 BRPM | HEIGHT: 71 IN | TEMPERATURE: 96.8 F

## 2018-10-03 PROCEDURE — 6370000000 HC RX 637 (ALT 250 FOR IP): Performed by: PSYCHIATRY & NEUROLOGY

## 2018-10-03 PROCEDURE — 99238 HOSP IP/OBS DSCHRG MGMT 30/<: CPT | Performed by: PSYCHIATRY & NEUROLOGY

## 2018-10-03 PROCEDURE — 5130000000 HC BRIDGE APPOINTMENT

## 2018-10-03 RX ORDER — TRAZODONE HYDROCHLORIDE 50 MG/1
50 TABLET ORAL NIGHTLY PRN
Qty: 30 TABLET | Refills: 0 | Status: SHIPPED | OUTPATIENT
Start: 2018-10-03 | End: 2018-10-31 | Stop reason: SDUPTHER

## 2018-10-03 RX ORDER — QUETIAPINE FUMARATE 25 MG/1
12.5 TABLET, FILM COATED ORAL NIGHTLY
Qty: 15 TABLET | Refills: 0 | Status: SHIPPED | OUTPATIENT
Start: 2018-10-03 | End: 2018-10-31 | Stop reason: SDUPTHER

## 2018-10-03 RX ORDER — HYDROXYZINE PAMOATE 25 MG/1
25 CAPSULE ORAL 3 TIMES DAILY PRN
Qty: 90 CAPSULE | Refills: 0 | Status: SHIPPED | OUTPATIENT
Start: 2018-10-03 | End: 2018-10-17

## 2018-10-03 RX ORDER — DULOXETIN HYDROCHLORIDE 60 MG/1
60 CAPSULE, DELAYED RELEASE ORAL DAILY
Qty: 30 CAPSULE | Refills: 0 | Status: SHIPPED | OUTPATIENT
Start: 2018-10-04 | End: 2018-10-31 | Stop reason: SDUPTHER

## 2018-10-03 RX ADMIN — DULOXETINE HYDROCHLORIDE 60 MG: 60 CAPSULE, DELAYED RELEASE ORAL at 09:20

## 2018-10-03 ASSESSMENT — PAIN SCALES - GENERAL: PAINLEVEL_OUTOF10: 0

## 2018-10-03 NOTE — BH NOTE
Group Therapy Note    Date: 10/3/2018  Start Time: 2000  End Time:  2020    Type of Group: Wrap-Up    Patient's Goal:  To just chill out and go home tomorrow    Notes:  ongoing    Status After Intervention:  Unchanged    Participation Quality: Attentive      Speech:  normal      Thought Process/Content: Logical      Affective Functioning: Congruent      Mood: euthymic      Level of consciousness:  Alert      Response to Learning: Progressing to goal      Endings: None Reported    Modes of Intervention: Socialization      Discipline Responsible: Registered Nurse      Signature:  Meenu Mcfarlane RN

## 2018-10-03 NOTE — PLAN OF CARE
Problem: Discharge Planning:  Goal: Discharged to appropriate level of care  Discharged to appropriate level of care   Outcome: Not Met This Shift  Discharge planners working with patient to achieve optimal discharge plan, specific to the needs of this patient. Problem: KNOWLEDGE DEFICIT,EDUCATION,DISCHARGE PLAN  Goal: Knowledge - personal safety  Outcome: Ongoing  Pt did not fill out safety plan yet    Problem: Depressive Behavior With or Without Suicide Precautions:  Goal: Able to verbalize and/or display a decrease in depressive symptoms  Able to verbalize and/or display a decrease in depressive symptoms   Outcome: Not Met This Shift  Patient reports mood as good. Has somewhat brightened affect. Speech clear and relevant. good eye contact. Reports hope for future and identifies mom and dad as their support system. Goal: Ability to disclose and discuss suicidal ideas will improve  Ability to disclose and discuss suicidal ideas will improve   Outcome: Met This Shift  Patient denies suicidal ideations, no plan or intent to harm self. Patient remains on suicidal precautions 15 checks for safety. Instructed to seek staff as needed for thoughts of self harm. Goal: Able to verbalize support systems  Able to verbalize support systems   Outcome: Completed Date Met: 09/30/18  Patient reports mom and dad as their support system. Goal: Absence of self-harm  Absence of self-harm   Outcome: Met This Shift  No self harm behaviors were observed or reported so far this shift. Remains on every 15 minutes precautions for safety. Problem: Substance Abuse:  Goal: Absence of drug withdrawal signs and symptoms  Absence of drug withdrawal signs and symptoms   Outcome: Met This Shift  Patient denies all symptoms of withdrawal.  Goal: Participates in care planning  Participates in care planning   Outcome: Met This Shift  This patient participates in his care planning    Problem:  Activity:  Goal: Sleeping patterns will
Problem: Discharge Planning:  Goal: Discharged to appropriate level of care  Discharged to appropriate level of care   Outcome: Ongoing  Pt plans to return home with his mom    Problem: Depressive Behavior With or Without Suicide Precautions:  Goal: Able to verbalize and/or display a decrease in depressive symptoms  Able to verbalize and/or display a decrease in depressive symptoms   Outcome: Ongoing  Rates mood 7/10, blunt, brightens, neat and clean appearance, good eye contact, is hopeful, some peer interaction noted  Goal: Ability to disclose and discuss suicidal ideas will improve  Ability to disclose and discuss suicidal ideas will improve   Outcome: Completed Date Met: 10/03/18  Pt denies suicidal thoughts  Goal: Absence of self-harm  Absence of self-harm   Outcome: Completed Date Met: 10/03/18  No self harm    Problem: Substance Abuse:  Goal: Participates in care planning  Participates in care planning   Outcome: Met This Shift      Problem: Activity:  Goal: Sleeping patterns will improve  Sleeping patterns will improve   Outcome: Met This Shift  Pt states he is sleeping good, had scheduled seroquel and requested trazodone    Problem: Anxiety:  Goal: Level of anxiety will decrease  Level of anxiety will decrease   Outcome: Completed Date Met: 10/03/18  Pt denies feeling anxious    Comments: Care plan reviewed with patient.   Patient does verbalize understanding of the plan of care and does contribute to goal setting
Problem: KNOWLEDGE DEFICIT,EDUCATION,DISCHARGE PLAN  Goal: Knowledge - personal safety    Intervention: Discharge safety plan  1. What are the warning signs when you begin thinking suicide or when you feel very distressed? Telling people that it is the last time that I see them, avoiding answering questions  2. What can you do by yourself to take your mind off of the problem? Listen to music, write poems; Having to wait till I have access to materials  3. If you are unable to deal with your distressed mood alone, contact trusted family members or friends. List several people in case your first choices are not available. Mom, Dad, Lam Carrillo (numbers in my phone  4. Contact local professionals or emergency services if you continue to have suicidal thoughts or serious distress.   No answer given    Christiano  79. PHONE NUMBERS:        [phone]        [phone]        [phone] (for deaf or hearing impaired)
provided Vistaril 25 mg prn . Spoke with Dr. Marsha Ervin who ordered one time dose of Seroquel 25 mg. Pt was moved into a private room     Comments: Care plan reviewed with patient.   Patient does  verbalize understanding of the plan of care and did contribute to goal setting by establishing a short term goal \"to stay relaxed\"

## 2018-10-03 NOTE — TELEPHONE ENCOUNTER
[employer] from [facility] called. She stated that patient is being discharged today. She's asking if Dr. Kevyn Best is comfortable following up with patient's psych meds? He's on seroquel, cymbalta, trazodone and vistaril. Please let her know.

## 2018-10-04 ENCOUNTER — NURSE TRIAGE (OUTPATIENT)
Dept: ADMINISTRATIVE | Age: 18
End: 2018-10-04

## 2018-10-04 ENCOUNTER — TELEPHONE (OUTPATIENT)
Dept: FAMILY MEDICINE CLINIC | Age: 18
End: 2018-10-04

## 2018-10-05 ENCOUNTER — TELEPHONE (OUTPATIENT)
Dept: PSYCHIATRY | Age: 18
End: 2018-10-05

## 2018-10-05 NOTE — TELEPHONE ENCOUNTER
Called patient about his referral for [facility] following his discharge from . Patient stated that he is scheduled to follow with Dr. José Samuel for his follow up appointments for counseling.

## 2018-10-25 ENCOUNTER — OFFICE VISIT (OUTPATIENT)
Dept: BEHAVIORAL/MENTAL HEALTH CLINIC | Age: 18
End: 2018-10-25
Payer: COMMERCIAL

## 2018-10-25 DIAGNOSIS — F31.81 BIPOLAR II DISORDER (HCC): Primary | ICD-10-CM

## 2018-10-25 DIAGNOSIS — F19.959 SUBSTANCE-INDUCED PSYCHOTIC DISORDER (HCC): ICD-10-CM

## 2018-10-25 DIAGNOSIS — F19.20 POLYSUBSTANCE DEPENDENCE INCLUDING OPIOID TYPE DRUG, EPISODIC ABUSE (HCC): ICD-10-CM

## 2018-10-25 DIAGNOSIS — F11.20 POLYSUBSTANCE DEPENDENCE INCLUDING OPIOID TYPE DRUG, EPISODIC ABUSE (HCC): ICD-10-CM

## 2018-10-25 PROCEDURE — 90791 PSYCH DIAGNOSTIC EVALUATION: CPT | Performed by: PSYCHOLOGIST

## 2018-10-25 ASSESSMENT — PATIENT HEALTH QUESTIONNAIRE - PHQ9
SUM OF ALL RESPONSES TO PHQ QUESTIONS 1-9: 18
6. FEELING BAD ABOUT YOURSELF - OR THAT YOU ARE A FAILURE OR HAVE LET YOURSELF OR YOUR FAMILY DOWN: 3
9. THOUGHTS THAT YOU WOULD BE BETTER OFF DEAD, OR OF HURTING YOURSELF: 2
1. LITTLE INTEREST OR PLEASURE IN DOING THINGS: 3
4. FEELING TIRED OR HAVING LITTLE ENERGY: 1
10. IF YOU CHECKED OFF ANY PROBLEMS, HOW DIFFICULT HAVE THESE PROBLEMS MADE IT FOR YOU TO DO YOUR WORK, TAKE CARE OF THINGS AT HOME, OR GET ALONG WITH OTHER PEOPLE: 1
3. TROUBLE FALLING OR STAYING ASLEEP: 3
2. FEELING DOWN, DEPRESSED OR HOPELESS: 2
5. POOR APPETITE OR OVEREATING: 1
7. TROUBLE CONCENTRATING ON THINGS, SUCH AS READING THE NEWSPAPER OR WATCHING TELEVISION: 0
8. MOVING OR SPEAKING SO SLOWLY THAT OTHER PEOPLE COULD HAVE NOTICED. OR THE OPPOSITE, BEING SO FIGETY OR RESTLESS THAT YOU HAVE BEEN MOVING AROUND A LOT MORE THAN USUAL: 3
SUM OF ALL RESPONSES TO PHQ9 QUESTIONS 1 & 2: 5
SUM OF ALL RESPONSES TO PHQ QUESTIONS 1-9: 18

## 2018-10-28 ENCOUNTER — TELEPHONE (OUTPATIENT)
Dept: FAMILY MEDICINE CLINIC | Age: 18
End: 2018-10-28

## 2018-10-28 DIAGNOSIS — F31.81 BIPOLAR II DISORDER (HCC): Primary | ICD-10-CM

## 2018-10-28 DIAGNOSIS — F41.9 ANXIETY: Chronic | ICD-10-CM

## 2018-10-28 DIAGNOSIS — F41.0 PANIC DISORDER: Chronic | ICD-10-CM

## 2018-10-28 NOTE — TELEPHONE ENCOUNTER
Please call pt   He no-showed visit with me last wk    He did see Kvng Lott. He needs referral for psychiatry office. I've placed this referral.     Let me know if questions, thanks! ASSESSMENT & PLAN  1. Bipolar II disorder ([facility] [address]    - Charmayne Moras, MD    2. Pennie Dale MD    3.  Panic disorder    - [facility] - Michell Crowder MD        Future Appointments  Date Time Provider Joo Louie   11/1/2018 11:30 AM [facility] - [facility]A [facility]AGO [facility]   12/6/2018 9:30 AM [facility] [address] [facility]

## 2018-10-31 ENCOUNTER — TELEPHONE (OUTPATIENT)
Dept: FAMILY MEDICINE CLINIC | Age: 18
End: 2018-10-31

## 2018-10-31 DIAGNOSIS — F31.81 BIPOLAR II DISORDER (HCC): Primary | ICD-10-CM

## 2018-10-31 RX ORDER — QUETIAPINE FUMARATE 25 MG/1
12.5 TABLET, FILM COATED ORAL NIGHTLY
Qty: 15 TABLET | Refills: 1 | Status: ON HOLD | OUTPATIENT
Start: 2018-10-31 | End: 2020-10-11

## 2018-10-31 RX ORDER — DULOXETIN HYDROCHLORIDE 60 MG/1
60 CAPSULE, DELAYED RELEASE ORAL DAILY
Qty: 30 CAPSULE | Refills: 1 | Status: ON HOLD | OUTPATIENT
Start: 2018-10-31 | End: 2020-10-11

## 2018-10-31 RX ORDER — TRAZODONE HYDROCHLORIDE 50 MG/1
50 TABLET ORAL NIGHTLY PRN
Qty: 30 TABLET | Refills: 1 | Status: ON HOLD | OUTPATIENT
Start: 2018-10-31 | End: 2020-10-11

## 2018-10-31 NOTE — TELEPHONE ENCOUNTER
[facility] called stating they've received the pt's referral but he is not being scheduled in their ofc due to the pt being referred to IOP.

## 2018-11-09 ENCOUNTER — TELEPHONE (OUTPATIENT)
Dept: PSYCHOLOGY | Age: 18
End: 2018-11-09

## 2018-12-13 ENCOUNTER — HOSPITAL ENCOUNTER (EMERGENCY)
Age: 18
Discharge: HOME | End: 2018-12-13
Attending: EMERGENCY MEDICINE
Payer: COMMERCIAL

## 2018-12-13 ENCOUNTER — TELEPHONE (OUTPATIENT)
Dept: FAMILY MEDICINE CLINIC | Age: 18
End: 2018-12-13

## 2018-12-13 ENCOUNTER — TELEPHONE (OUTPATIENT)
Dept: BEHAVIORAL/MENTAL HEALTH CLINIC | Age: 18
End: 2018-12-13

## 2018-12-13 VITALS
OXYGEN SATURATION: 96 % | DIASTOLIC BLOOD PRESSURE: 49 MMHG | HEART RATE: 74 BPM | BODY MASS INDEX: 24.05 KG/M2 | SYSTOLIC BLOOD PRESSURE: 116 MMHG | RESPIRATION RATE: 16 BRPM | TEMPERATURE: 99.6 F | WEIGHT: 170 LBS

## 2018-12-13 DIAGNOSIS — F19.10 POLYSUBSTANCE ABUSE (HCC): ICD-10-CM

## 2018-12-13 DIAGNOSIS — T40.1X1A ACCIDENTAL OVERDOSE OF HEROIN, INITIAL ENCOUNTER (HCC): Primary | ICD-10-CM

## 2018-12-13 LAB
ANION GAP SERPL CALCULATED.3IONS-SCNC: 13 MEQ/L (ref 8–16)
BASOPHILS # BLD: 0.4 %
BASOPHILS ABSOLUTE: 0 THOU/MM3 (ref 0–0.1)
BUN BLDV-MCNC: 12 MG/DL (ref 7–22)
CALCIUM SERPL-MCNC: 8.8 MG/DL (ref 8.5–10.5)
CHLORIDE BLD-SCNC: 100 MEQ/L (ref 98–111)
CO2: 27 MEQ/L (ref 23–33)
CREAT SERPL-MCNC: 0.8 MG/DL (ref 0.4–1.2)
EKG ATRIAL RATE: 94 BPM
EKG P AXIS: 57 DEGREES
EKG P-R INTERVAL: 156 MS
EKG Q-T INTERVAL: 334 MS
EKG QRS DURATION: 102 MS
EKG QTC CALCULATION (BAZETT): 417 MS
EKG R AXIS: 84 DEGREES
EKG T AXIS: 58 DEGREES
EKG VENTRICULAR RATE: 94 BPM
EOSINOPHIL # BLD: 4.9 %
EOSINOPHILS ABSOLUTE: 0.2 THOU/MM3 (ref 0–0.4)
ERYTHROCYTE [DISTWIDTH] IN BLOOD BY AUTOMATED COUNT: 12.9 % (ref 11.5–14.5)
ERYTHROCYTE [DISTWIDTH] IN BLOOD BY AUTOMATED COUNT: 40.9 FL (ref 35–45)
GFR SERPL CREATININE-BSD FRML MDRD: > 90 ML/MIN/1.73M2
GLUCOSE BLD-MCNC: 95 MG/DL (ref 70–108)
HCT VFR BLD CALC: 41.4 % (ref 42–52)
HEMOGLOBIN: 14.4 GM/DL (ref 14–18)
IMMATURE GRANS (ABS): 0.01 THOU/MM3 (ref 0–0.07)
IMMATURE GRANULOCYTES: 0.2 %
LYMPHOCYTES # BLD: 32.2 %
LYMPHOCYTES ABSOLUTE: 1.6 THOU/MM3 (ref 1–4.8)
MCH RBC QN AUTO: 30.4 PG (ref 26–33)
MCHC RBC AUTO-ENTMCNC: 34.8 GM/DL (ref 32.2–35.5)
MCV RBC AUTO: 87.5 FL (ref 80–94)
MONOCYTES # BLD: 7.3 %
MONOCYTES ABSOLUTE: 0.4 THOU/MM3 (ref 0.4–1.3)
NUCLEATED RED BLOOD CELLS: 0 /100 WBC
OSMOLALITY CALCULATION: 279 MOSMOL/KG (ref 275–300)
PLATELET # BLD: 201 THOU/MM3 (ref 130–400)
PMV BLD AUTO: 10.2 FL (ref 9.4–12.4)
POTASSIUM SERPL-SCNC: 4.2 MEQ/L (ref 3.5–5.2)
RBC # BLD: 4.73 MILL/MM3 (ref 4.7–6.1)
SEG NEUTROPHILS: 55 %
SEGMENTED NEUTROPHILS ABSOLUTE COUNT: 2.7 THOU/MM3 (ref 1.8–7.7)
SODIUM BLD-SCNC: 140 MEQ/L (ref 135–145)
WBC # BLD: 4.9 THOU/MM3 (ref 4.8–10.8)

## 2018-12-13 PROCEDURE — 85025 COMPLETE CBC W/AUTO DIFF WBC: CPT

## 2018-12-13 PROCEDURE — 36415 COLL VENOUS BLD VENIPUNCTURE: CPT

## 2018-12-13 PROCEDURE — 80048 BASIC METABOLIC PNL TOTAL CA: CPT

## 2018-12-13 PROCEDURE — 93005 ELECTROCARDIOGRAM TRACING: CPT | Performed by: EMERGENCY MEDICINE

## 2018-12-13 PROCEDURE — 99284 EMERGENCY DEPT VISIT MOD MDM: CPT

## 2018-12-13 ASSESSMENT — ENCOUNTER SYMPTOMS
SORE THROAT: 0
APNEA: 1
WHEEZING: 0
EYE DISCHARGE: 0
COUGH: 0
EYE REDNESS: 0
NAUSEA: 0
BACK PAIN: 0
VOMITING: 0
SHORTNESS OF BREATH: 0
DIARRHEA: 0
ABDOMINAL PAIN: 0
RHINORRHEA: 0

## 2018-12-13 NOTE — TELEPHONE ENCOUNTER
Called and left message on mom's cell phone with instruction to call IOP at [facility] and get patient set up with needed services there. They provide dual diagnosis treatment to meet his needs. No referral needed.

## 2018-12-13 NOTE — TELEPHONE ENCOUNTER
Mom informed and verbalized understanding. She will just wait to to f/u with IOP, she was informed someone would be in contact with her regarding IOP.

## 2018-12-14 PROCEDURE — 93010 ELECTROCARDIOGRAM REPORT: CPT | Performed by: INTERNAL MEDICINE

## 2019-07-15 ENCOUNTER — APPOINTMENT (OUTPATIENT)
Dept: GENERAL RADIOLOGY | Age: 19
End: 2019-07-15
Payer: COMMERCIAL

## 2019-07-15 ENCOUNTER — HOSPITAL ENCOUNTER (EMERGENCY)
Age: 19
Discharge: HOME | End: 2019-07-15
Payer: COMMERCIAL

## 2019-07-15 VITALS
SYSTOLIC BLOOD PRESSURE: 160 MMHG | BODY MASS INDEX: 23.34 KG/M2 | RESPIRATION RATE: 16 BRPM | OXYGEN SATURATION: 97 % | DIASTOLIC BLOOD PRESSURE: 56 MMHG | HEART RATE: 87 BPM | WEIGHT: 165 LBS | TEMPERATURE: 98.4 F

## 2019-07-15 DIAGNOSIS — F12.10 CANNABIS ABUSE: ICD-10-CM

## 2019-07-15 DIAGNOSIS — T50.901A MEDICATION OVERDOSE, ACCIDENTAL OR UNINTENTIONAL, INITIAL ENCOUNTER: Primary | ICD-10-CM

## 2019-07-15 DIAGNOSIS — F15.10 METHAMPHETAMINE ABUSE (HCC): ICD-10-CM

## 2019-07-15 LAB
ACETAMINOPHEN LEVEL: < 5 UG/ML (ref 0–20)
ALBUMIN SERPL-MCNC: 5 G/DL (ref 3.5–5.1)
ALP BLD-CCNC: 75 U/L (ref 38–126)
ALT SERPL-CCNC: 90 U/L (ref 11–66)
AMPHETAMINE+METHAMPHETAMINE URINE SCREEN: POSITIVE
ANION GAP SERPL CALCULATED.3IONS-SCNC: 15 MEQ/L (ref 8–16)
AST SERPL-CCNC: 49 U/L (ref 5–40)
BARBITURATE QUANTITATIVE URINE: NEGATIVE
BASOPHILS # BLD: 0.4 %
BASOPHILS ABSOLUTE: 0 THOU/MM3 (ref 0–0.1)
BENZODIAZEPINE QUANTITATIVE URINE: NEGATIVE
BILIRUB SERPL-MCNC: 0.8 MG/DL (ref 0.3–1.2)
BILIRUBIN DIRECT: < 0.2 MG/DL (ref 0–0.3)
BILIRUBIN URINE: NEGATIVE
BLOOD, URINE: NEGATIVE
BUN BLDV-MCNC: 7 MG/DL (ref 7–22)
CALCIUM SERPL-MCNC: 9.9 MG/DL (ref 8.5–10.5)
CANNABINOID QUANTITATIVE URINE: POSITIVE
CHARACTER, URINE: CLEAR
CHLORIDE BLD-SCNC: 98 MEQ/L (ref 98–111)
CO2: 30 MEQ/L (ref 23–33)
COCAINE METABOLITE QUANTITATIVE URINE: NEGATIVE
COLOR: YELLOW
CREAT SERPL-MCNC: 0.9 MG/DL (ref 0.4–1.2)
EKG ATRIAL RATE: 115 BPM
EKG P AXIS: 61 DEGREES
EKG P-R INTERVAL: 142 MS
EKG Q-T INTERVAL: 320 MS
EKG QRS DURATION: 108 MS
EKG QTC CALCULATION (BAZETT): 442 MS
EKG R AXIS: 88 DEGREES
EKG T AXIS: 27 DEGREES
EKG VENTRICULAR RATE: 115 BPM
EOSINOPHIL # BLD: 0.4 %
EOSINOPHILS ABSOLUTE: 0 THOU/MM3 (ref 0–0.4)
ERYTHROCYTE [DISTWIDTH] IN BLOOD BY AUTOMATED COUNT: 13.4 % (ref 11.5–14.5)
ERYTHROCYTE [DISTWIDTH] IN BLOOD BY AUTOMATED COUNT: 43.5 FL (ref 35–45)
ETHYL ALCOHOL, SERUM: < 0.01 %
GFR SERPL CREATININE-BSD FRML MDRD: > 90 ML/MIN/1.73M2
GLUCOSE BLD-MCNC: 112 MG/DL (ref 70–108)
GLUCOSE URINE: NEGATIVE MG/DL
HCT VFR BLD CALC: 48 % (ref 42–52)
HEMOGLOBIN: 16.8 GM/DL (ref 14–18)
IMMATURE GRANS (ABS): 0.02 THOU/MM3 (ref 0–0.07)
IMMATURE GRANULOCYTES: 0.3 %
KETONES, URINE: NEGATIVE
LEUKOCYTE ESTERASE, URINE: NEGATIVE
LIPASE: 16.5 U/L (ref 5.6–51.3)
LYMPHOCYTES # BLD: 15.3 %
LYMPHOCYTES ABSOLUTE: 1.1 THOU/MM3 (ref 1–4.8)
MAGNESIUM: 1.7 MG/DL (ref 1.6–2.4)
MCH RBC QN AUTO: 31.1 PG (ref 26–33)
MCHC RBC AUTO-ENTMCNC: 35 GM/DL (ref 32.2–35.5)
MCV RBC AUTO: 88.9 FL (ref 80–94)
MONOCYTES # BLD: 11.2 %
MONOCYTES ABSOLUTE: 0.8 THOU/MM3 (ref 0.4–1.3)
NITRITE, URINE: NEGATIVE
NUCLEATED RED BLOOD CELLS: 0 /100 WBC
OPIATES, URINE: NEGATIVE
OSMOLALITY CALCULATION: 283.7 MOSMOL/KG (ref 275–300)
OXYCODONE: NEGATIVE
PH UA: 8.5 (ref 5–9)
PHENCYCLIDINE QUANTITATIVE URINE: NEGATIVE
PHOSPHORUS: 2.3 MG/DL (ref 2.4–4.7)
PLATELET # BLD: 257 THOU/MM3 (ref 130–400)
PMV BLD AUTO: 10.1 FL (ref 9.4–12.4)
POTASSIUM SERPL-SCNC: 3.7 MEQ/L (ref 3.5–5.2)
PROTEIN UA: NEGATIVE
RBC # BLD: 5.4 MILL/MM3 (ref 4.7–6.1)
SALICYLATE, SERUM: < 0.3 MG/DL (ref 2–10)
SEG NEUTROPHILS: 72.4 %
SEGMENTED NEUTROPHILS ABSOLUTE COUNT: 5.1 THOU/MM3 (ref 1.8–7.7)
SODIUM BLD-SCNC: 143 MEQ/L (ref 135–145)
SPECIFIC GRAVITY, URINE: 1.01 (ref 1–1.03)
TOTAL PROTEIN: 7.5 G/DL (ref 6.1–8)
TROPONIN T: < 0.01 NG/ML
TSH SERPL DL<=0.05 MIU/L-ACNC: 3.46 UIU/ML (ref 0.4–4.2)
UROBILINOGEN, URINE: 0.2 EU/DL (ref 0–1)
WBC # BLD: 7.1 THOU/MM3 (ref 4.8–10.8)

## 2019-07-15 PROCEDURE — 93005 ELECTROCARDIOGRAM TRACING: CPT | Performed by: PHYSICIAN ASSISTANT

## 2019-07-15 PROCEDURE — 85025 COMPLETE CBC W/AUTO DIFF WBC: CPT

## 2019-07-15 PROCEDURE — 80053 COMPREHEN METABOLIC PANEL: CPT

## 2019-07-15 PROCEDURE — 84443 ASSAY THYROID STIM HORMONE: CPT

## 2019-07-15 PROCEDURE — 83690 ASSAY OF LIPASE: CPT

## 2019-07-15 PROCEDURE — 2580000003 HC RX 258: Performed by: PHYSICIAN ASSISTANT

## 2019-07-15 PROCEDURE — 82248 BILIRUBIN DIRECT: CPT

## 2019-07-15 PROCEDURE — 80307 DRUG TEST PRSMV CHEM ANLYZR: CPT

## 2019-07-15 PROCEDURE — G0480 DRUG TEST DEF 1-7 CLASSES: HCPCS

## 2019-07-15 PROCEDURE — 84100 ASSAY OF PHOSPHORUS: CPT

## 2019-07-15 PROCEDURE — 84484 ASSAY OF TROPONIN QUANT: CPT

## 2019-07-15 PROCEDURE — 81003 URINALYSIS AUTO W/O SCOPE: CPT

## 2019-07-15 PROCEDURE — 36415 COLL VENOUS BLD VENIPUNCTURE: CPT

## 2019-07-15 PROCEDURE — 93010 ELECTROCARDIOGRAM REPORT: CPT | Performed by: NUCLEAR MEDICINE

## 2019-07-15 PROCEDURE — 99284 EMERGENCY DEPT VISIT MOD MDM: CPT

## 2019-07-15 PROCEDURE — 83735 ASSAY OF MAGNESIUM: CPT

## 2019-07-15 PROCEDURE — 71045 X-RAY EXAM CHEST 1 VIEW: CPT

## 2019-07-15 RX ORDER — 0.9 % SODIUM CHLORIDE 0.9 %
1000 INTRAVENOUS SOLUTION INTRAVENOUS ONCE
Status: COMPLETED | OUTPATIENT
Start: 2019-07-15 | End: 2019-07-15

## 2019-07-15 RX ORDER — ONDANSETRON 2 MG/ML
4 INJECTION INTRAMUSCULAR; INTRAVENOUS ONCE
Status: DISCONTINUED | OUTPATIENT
Start: 2019-07-15 | End: 2019-07-15 | Stop reason: HOSPADM

## 2019-07-15 RX ORDER — ONDANSETRON 4 MG/1
4 TABLET, ORALLY DISINTEGRATING ORAL EVERY 8 HOURS PRN
Qty: 20 TABLET | Refills: 0 | Status: ON HOLD | OUTPATIENT
Start: 2019-07-15 | End: 2020-10-11

## 2019-07-15 RX ORDER — DICYCLOMINE HYDROCHLORIDE 10 MG/1
10 CAPSULE ORAL ONCE
Status: DISCONTINUED | OUTPATIENT
Start: 2019-07-15 | End: 2019-07-15 | Stop reason: HOSPADM

## 2019-07-15 RX ADMIN — SODIUM CHLORIDE 1000 ML: 9 INJECTION, SOLUTION INTRAVENOUS at 17:12

## 2019-07-15 ASSESSMENT — ENCOUNTER SYMPTOMS
SHORTNESS OF BREATH: 1
NAUSEA: 1
ABDOMINAL PAIN: 1
VOMITING: 1

## 2019-07-15 NOTE — ED PROVIDER NOTES
light-headedness, numbness and headaches. Hematological: Negative for adenopathy. Psychiatric/Behavioral: Negative for agitation, confusion, dysphoric mood and suicidal ideas. The patient is not nervous/anxious. PAST MEDICAL HISTORY    has a past medical history of Anxiety and History of depression. SURGICAL HISTORY      has a past surgical history that includes Tibia fracture surgery (3/28/2013) and Hardware Removal (Right, 11/27/2013). CURRENT MEDICATIONS       Discharge Medication List as of 7/15/2019  8:32 PM      CONTINUE these medications which have NOT CHANGED    Details   DULoxetine (CYMBALTA) 60 MG extended release capsule Take 1 capsule by mouth daily, Disp-30 capsule, R-1Normal      QUEtiapine (SEROQUEL) 25 MG tablet Take 0.5 tablets by mouth nightly, Disp-15 tablet, R-1Normal      traZODone (DESYREL) 50 MG tablet Take 1 tablet by mouth nightly as needed for Sleep, Disp-30 tablet, R-1Normal      acetaminophen (TYLENOL) 500 MG tablet Take 1,000 mg by mouth every 6 hours as needed. multivitamin (THERAGRAN) per tablet Take 1 tablet by mouth daily. ALLERGIES     has No Known Allergies. FAMILY HISTORY     He indicated that his mother is alive. He indicated that his father is alive. He indicated that the status of his neg hx is unknown.   family history includes Breast Cancer (age of onset: 62) in his mother; Cancer in his father; Diabetes in his father; Other in his mother. SOCIAL HISTORY      reports that he has been smoking cigarettes. He has a 0.50 pack-year smoking history. He has never used smokeless tobacco. He reports that he drinks alcohol. He reports that he has current or past drug history. Drugs: Methamphetamines, Marijuana, IV, and Cocaine. PHYSICAL EXAM     INITIAL VITALS:  weight is 165 lb (74.8 kg). His oral temperature is 98.4 °F (36.9 °C). His blood pressure is 160/56 (abnormal) and his pulse is 87. His respiration is 16 and oxygen saturation is 97%. 4:34 PM: The patient was seen and evaluated. MDM:  The patient was seen and evaluated within the ED today with concern for overdose on zinc tablets and acai berry supplements. Within the department, I observed the patient's vital signs to be within acceptable range. HR was 118 bpm on arrival but was 87 bpm at the time of discharge. On exam, I appreciated clear lung sounds. There was no abdominal tenderness, guarding, rigidity, or rebound. Radiologic studies within the department revealed no acute intrapulmonary process, infiltrations, effusions, or consolidations. Laboratory work was reassuring. UDS is positive for methamphetamines and cannabis. Within the department, the patient was treated with IV saline. I observed the patient's condition to improve during the duration of the stay. I explained my proposed course of treatment to the patient and his family, who were amenable to my treatment and discharge decisions. He was discharged home in stable condition, and the patient will return to the ED if the symptoms become more severe in nature or otherwise change. He will otherwise follow up with the substance abuse counseling resources provided by [facility]. CRITICAL CARE:   None    CONSULTS:  Discussed the case with my attending physician in the Emergency Department, who agreed with my workup, treatment, and disposition decisions. MARNI [address] IV saline administration but otherwise no acute intervention    [facility] - provided outpatient substance abuse counseling resources    PROCEDURES:  None    FINAL IMPRESSION      1. Medication overdose, accidental or unintentional, initial encounter    2. Methamphetamine abuse ([facility] [address]    3. Cannabis abuse          DISPOSITION/PLAN   I have given the patient strict written and verbal instructions about care at home, follow-up, and signs and symptoms of worsening of condition, and the patient did verbalize understanding of these instructions.  Patient was discharged in stable condition. Will return if symptoms change or worsen, or for any sign or symptom deemed emergent by the patient or family members. Follow up as an outpatient or sooner if symptoms warrant. PATIENT REFERRED TO:  [facility]  [address]  Call in 3 days  As needed, If symptoms worsen      DISCHARGE MEDICATIONS:  Discharge Medication List as of 7/15/2019  8:32 PM      START taking these medications    Details   ondansetron (ZOFRAN ODT) 4 MG disintegrating tablet Take 1 tablet by mouth every 8 hours as needed for Nausea, Disp-20 tablet, R-0Print             (Please note that portions of this note were completed with a voice recognition program.  Efforts were made to edit the dictations but occasionally words are mis-transcribed.)    The patient was given an opportunity to see the Emergency Attending. The patient voiced understanding that I was a Mid-LevelProvider and was in agreement with being seen independently by myself. Scribe:  Ida Salinas 7/15/19 4:34 PM Scribing for and in the presence of Gregorio Everett PA-C. Signed by: Nir Roper, 07/16/19 9:22 AM    Provider:  I personally performed the services described in the documentation, reviewed and edited the documentation which was dictated to the scribe in my presence, and it accurately records my words and actions.     Gregorio Everett PA-C 7/15/19 9:22 AM        Gregorio Everett PA-C  07/16/19 0930

## 2019-07-15 NOTE — ED TRIAGE NOTES
Patient presents via squad for concerns of overdosing on acai berry supplement and zinc. States he took 10 50mg zinc pills, \"a bunch of acai berry pills\", fruit pectin, and \"some jello stuff\" in attempts to pass a urine drug screen. States he smoked weed this weekend and then did meth last night. States he also has had a few energy drinks today to stay awake. Patients parents present at bedside asking patient what drugs he used. Patients father upset saying patient was supposedly at a friends house playing [employer] this weekend but then, Silvestre Caceres was doing other shit. \"

## 2019-07-16 ASSESSMENT — ENCOUNTER SYMPTOMS
DIARRHEA: 0
WHEEZING: 0
EYE REDNESS: 0
BACK PAIN: 0
COUGH: 0
CONSTIPATION: 0
EYE DISCHARGE: 0
SORE THROAT: 0
RHINORRHEA: 0
COLOR CHANGE: 0

## 2020-05-26 ENCOUNTER — HOSPITAL ENCOUNTER (OUTPATIENT)
Dept: ULTRASOUND IMAGING | Age: 20
Discharge: HOME | End: 2020-05-26
Payer: COMMERCIAL

## 2020-05-26 PROCEDURE — 76705 ECHO EXAM OF ABDOMEN: CPT

## 2020-05-26 PROCEDURE — 76981 USE PARENCHYMA: CPT

## 2020-10-11 ENCOUNTER — HOSPITAL ENCOUNTER (OUTPATIENT)
Age: 20
Setting detail: OBSERVATION
Discharge: HOME | End: 2020-10-12
Attending: EMERGENCY MEDICINE | Admitting: OPHTHALMOLOGY
Payer: COMMERCIAL

## 2020-10-11 ENCOUNTER — APPOINTMENT (OUTPATIENT)
Dept: CT IMAGING | Age: 20
End: 2020-10-11
Payer: COMMERCIAL

## 2020-10-11 PROBLEM — T51.91XA: Status: ACTIVE | Noted: 2020-10-11

## 2020-10-11 LAB
ACETAMINOPHEN LEVEL: < 5 UG/ML (ref 0–20)
ALBUMIN SERPL-MCNC: 5.2 G/DL (ref 3.5–5.1)
ALP BLD-CCNC: 70 U/L (ref 38–126)
ALT SERPL-CCNC: 23 U/L (ref 11–66)
AMPHETAMINE+METHAMPHETAMINE URINE SCREEN: NEGATIVE
ANION GAP SERPL CALCULATED.3IONS-SCNC: 10 MEQ/L (ref 8–16)
ANION GAP SERPL CALCULATED.3IONS-SCNC: 18 MEQ/L (ref 8–16)
ANION GAP SERPL CALCULATED.3IONS-SCNC: 31 MEQ/L (ref 8–16)
AST SERPL-CCNC: 24 U/L (ref 5–40)
BARBITURATE QUANTITATIVE URINE: NEGATIVE
BASE EXCESS MIXED: -6.6 MMOL/L (ref -2–3)
BASOPHILS # BLD: 0.5 %
BASOPHILS ABSOLUTE: 0.1 THOU/MM3 (ref 0–0.1)
BENZODIAZEPINE QUANTITATIVE URINE: NEGATIVE
BILIRUB SERPL-MCNC: 0.9 MG/DL (ref 0.3–1.2)
BUN BLDV-MCNC: 15 MG/DL (ref 7–22)
BUN BLDV-MCNC: 18 MG/DL (ref 7–22)
BUN BLDV-MCNC: 20 MG/DL (ref 7–22)
CALCIUM SERPL-MCNC: 10.2 MG/DL (ref 8.5–10.5)
CALCIUM SERPL-MCNC: 8.8 MG/DL (ref 8.5–10.5)
CALCIUM SERPL-MCNC: 9.5 MG/DL (ref 8.5–10.5)
CANNABINOID QUANTITATIVE URINE: POSITIVE
CHLORIDE BLD-SCNC: 100 MEQ/L (ref 98–111)
CHLORIDE BLD-SCNC: 101 MEQ/L (ref 98–111)
CHLORIDE BLD-SCNC: 103 MEQ/L (ref 98–111)
CO2: 12 MEQ/L (ref 23–33)
CO2: 17 MEQ/L (ref 23–33)
CO2: 24 MEQ/L (ref 23–33)
COCAINE METABOLITE QUANTITATIVE URINE: NEGATIVE
COLLECTED BY:: ABNORMAL
CREAT SERPL-MCNC: 0.9 MG/DL (ref 0.4–1.2)
CREAT SERPL-MCNC: 0.9 MG/DL (ref 0.4–1.2)
CREAT SERPL-MCNC: 1 MG/DL (ref 0.4–1.2)
EKG ATRIAL RATE: 97 BPM
EKG P AXIS: 73 DEGREES
EKG P-R INTERVAL: 122 MS
EKG Q-T INTERVAL: 358 MS
EKG QRS DURATION: 90 MS
EKG QTC CALCULATION (BAZETT): 454 MS
EKG R AXIS: 67 DEGREES
EKG T AXIS: 60 DEGREES
EKG VENTRICULAR RATE: 97 BPM
EOSINOPHIL # BLD: 0.4 %
EOSINOPHILS ABSOLUTE: 0.1 THOU/MM3 (ref 0–0.4)
ERYTHROCYTE [DISTWIDTH] IN BLOOD BY AUTOMATED COUNT: 12.5 % (ref 11.5–14.5)
ERYTHROCYTE [DISTWIDTH] IN BLOOD BY AUTOMATED COUNT: 41.1 FL (ref 35–45)
ETHYL ALCOHOL, SERUM: 0.08 %
GFR SERPL CREATININE-BSD FRML MDRD: > 90 ML/MIN/1.73M2
GLUCOSE BLD-MCNC: 137 MG/DL (ref 70–108)
GLUCOSE BLD-MCNC: 63 MG/DL (ref 70–108)
GLUCOSE BLD-MCNC: 74 MG/DL (ref 70–108)
GLUCOSE BLD-MCNC: 75 MG/DL (ref 70–108)
HCO3, MIXED: 18 MMOL/L (ref 23–28)
HCT VFR BLD CALC: 46.3 % (ref 42–52)
HEMOGLOBIN: 16.6 GM/DL (ref 14–18)
IMMATURE GRANS (ABS): 0.06 THOU/MM3 (ref 0–0.07)
IMMATURE GRANULOCYTES: 0.4 %
LACTIC ACID: 1.4 MMOL/L (ref 0.5–2.2)
LACTIC ACID: 3.4 MMOL/L (ref 0.5–2.2)
LIPASE: 11.1 U/L (ref 5.6–51.3)
LYMPHOCYTES # BLD: 22 %
LYMPHOCYTES ABSOLUTE: 3.5 THOU/MM3 (ref 1–4.8)
MCH RBC QN AUTO: 32.2 PG (ref 26–33)
MCHC RBC AUTO-ENTMCNC: 35.9 GM/DL (ref 32.2–35.5)
MCV RBC AUTO: 89.7 FL (ref 80–94)
MONOCYTES # BLD: 4.5 %
MONOCYTES ABSOLUTE: 0.7 THOU/MM3 (ref 0.4–1.3)
NUCLEATED RED BLOOD CELLS: 0 /100 WBC
O2 SAT, MIXED: 82 %
OPIATES, URINE: NEGATIVE
OSMOLALITY CALCULATION: 270.7 MOSMOL/KG (ref 275–300)
OSMOLALITY CALCULATION: 289.4 MOSMOL/KG (ref 275–300)
OSMOLALITY: 323 MOSMOL/KG (ref 275–295)
OXYCODONE: NEGATIVE
PCO2, MIXED VENOUS: 33 MMHG (ref 41–51)
PH, MIXED: 7.35 (ref 7.31–7.41)
PHENCYCLIDINE QUANTITATIVE URINE: NEGATIVE
PLATELET # BLD: 349 THOU/MM3 (ref 130–400)
PMV BLD AUTO: 11.1 FL (ref 9.4–12.4)
PO2 MIXED: 48 MMHG (ref 25–40)
POTASSIUM REFLEX MAGNESIUM: 4.7 MEQ/L (ref 3.5–5.2)
POTASSIUM SERPL-SCNC: 3.6 MEQ/L (ref 3.5–5.2)
POTASSIUM SERPL-SCNC: 4.4 MEQ/L (ref 3.5–5.2)
RBC # BLD: 5.16 MILL/MM3 (ref 4.7–6.1)
SALICYLATE, SERUM: 0.4 MG/DL (ref 2–10)
SEG NEUTROPHILS: 72.2 %
SEGMENTED NEUTROPHILS ABSOLUTE COUNT: 11.6 THOU/MM3 (ref 1.8–7.7)
SODIUM BLD-SCNC: 135 MEQ/L (ref 135–145)
SODIUM BLD-SCNC: 135 MEQ/L (ref 135–145)
SODIUM BLD-SCNC: 146 MEQ/L (ref 135–145)
TOTAL PROTEIN: 8 G/DL (ref 6.1–8)
WBC # BLD: 16 THOU/MM3 (ref 4.8–10.8)

## 2020-10-11 PROCEDURE — 80307 DRUG TEST PRSMV CHEM ANLYZR: CPT

## 2020-10-11 PROCEDURE — 6360000002 HC RX W HCPCS: Performed by: OPHTHALMOLOGY

## 2020-10-11 PROCEDURE — 99219 PR INITIAL OBSERVATION CARE/DAY 50 MINUTES: CPT | Performed by: OPHTHALMOLOGY

## 2020-10-11 PROCEDURE — 2580000003 HC RX 258: Performed by: PHYSICIAN ASSISTANT

## 2020-10-11 PROCEDURE — 94761 N-INVAS EAR/PLS OXIMETRY MLT: CPT

## 2020-10-11 PROCEDURE — 83690 ASSAY OF LIPASE: CPT

## 2020-10-11 PROCEDURE — G0378 HOSPITAL OBSERVATION PER HR: HCPCS

## 2020-10-11 PROCEDURE — 83930 ASSAY OF BLOOD OSMOLALITY: CPT

## 2020-10-11 PROCEDURE — 6360000002 HC RX W HCPCS: Performed by: PHYSICIAN ASSISTANT

## 2020-10-11 PROCEDURE — 82803 BLOOD GASES ANY COMBINATION: CPT

## 2020-10-11 PROCEDURE — 85025 COMPLETE CBC W/AUTO DIFF WBC: CPT

## 2020-10-11 PROCEDURE — 2500000003 HC RX 250 WO HCPCS: Performed by: PHYSICIAN ASSISTANT

## 2020-10-11 PROCEDURE — 2580000003 HC RX 258: Performed by: OPHTHALMOLOGY

## 2020-10-11 PROCEDURE — G0480 DRUG TEST DEF 1-7 CLASSES: HCPCS

## 2020-10-11 PROCEDURE — 96375 TX/PRO/DX INJ NEW DRUG ADDON: CPT

## 2020-10-11 PROCEDURE — 83605 ASSAY OF LACTIC ACID: CPT

## 2020-10-11 PROCEDURE — 96374 THER/PROPH/DIAG INJ IV PUSH: CPT

## 2020-10-11 PROCEDURE — C9113 INJ PANTOPRAZOLE SODIUM, VIA: HCPCS | Performed by: OPHTHALMOLOGY

## 2020-10-11 PROCEDURE — 36415 COLL VENOUS BLD VENIPUNCTURE: CPT

## 2020-10-11 PROCEDURE — 82948 REAGENT STRIP/BLOOD GLUCOSE: CPT

## 2020-10-11 PROCEDURE — 80053 COMPREHEN METABOLIC PANEL: CPT

## 2020-10-11 PROCEDURE — 74177 CT ABD & PELVIS W/CONTRAST: CPT

## 2020-10-11 PROCEDURE — 93005 ELECTROCARDIOGRAM TRACING: CPT | Performed by: EMERGENCY MEDICINE

## 2020-10-11 PROCEDURE — 6360000004 HC RX CONTRAST MEDICATION: Performed by: PHYSICIAN ASSISTANT

## 2020-10-11 PROCEDURE — 99285 EMERGENCY DEPT VISIT HI MDM: CPT

## 2020-10-11 PROCEDURE — 6370000000 HC RX 637 (ALT 250 FOR IP): Performed by: OPHTHALMOLOGY

## 2020-10-11 RX ORDER — MULTIVITAMIN WITH IRON
1 TABLET ORAL DAILY
Status: DISCONTINUED | OUTPATIENT
Start: 2020-10-11 | End: 2020-10-12 | Stop reason: HOSPADM

## 2020-10-11 RX ORDER — ONDANSETRON 2 MG/ML
4 INJECTION INTRAMUSCULAR; INTRAVENOUS EVERY 6 HOURS PRN
Status: DISCONTINUED | OUTPATIENT
Start: 2020-10-11 | End: 2020-10-12 | Stop reason: HOSPADM

## 2020-10-11 RX ORDER — KETOROLAC TROMETHAMINE 30 MG/ML
30 INJECTION, SOLUTION INTRAMUSCULAR; INTRAVENOUS ONCE
Status: COMPLETED | OUTPATIENT
Start: 2020-10-11 | End: 2020-10-11

## 2020-10-11 RX ORDER — VELPATASVIR AND SOFOSBUVIR 100; 400 MG/1; MG/1
1 TABLET, FILM COATED ORAL DAILY
Status: DISCONTINUED | OUTPATIENT
Start: 2020-10-12 | End: 2020-10-12 | Stop reason: HOSPADM

## 2020-10-11 RX ORDER — ACETAMINOPHEN 325 MG/1
650 TABLET ORAL EVERY 4 HOURS PRN
Status: DISCONTINUED | OUTPATIENT
Start: 2020-10-11 | End: 2020-10-11

## 2020-10-11 RX ORDER — POTASSIUM CHLORIDE 20 MEQ/1
40 TABLET, EXTENDED RELEASE ORAL PRN
Status: DISCONTINUED | OUTPATIENT
Start: 2020-10-11 | End: 2020-10-12 | Stop reason: HOSPADM

## 2020-10-11 RX ORDER — SODIUM CHLORIDE 9 MG/ML
INJECTION, SOLUTION INTRAVENOUS CONTINUOUS
Status: DISCONTINUED | OUTPATIENT
Start: 2020-10-11 | End: 2020-10-12 | Stop reason: HOSPADM

## 2020-10-11 RX ORDER — POTASSIUM CHLORIDE 7.45 MG/ML
10 INJECTION INTRAVENOUS PRN
Status: DISCONTINUED | OUTPATIENT
Start: 2020-10-11 | End: 2020-10-12 | Stop reason: HOSPADM

## 2020-10-11 RX ORDER — ONDANSETRON 2 MG/ML
4 INJECTION INTRAMUSCULAR; INTRAVENOUS ONCE
Status: COMPLETED | OUTPATIENT
Start: 2020-10-11 | End: 2020-10-11

## 2020-10-11 RX ORDER — QUETIAPINE FUMARATE 25 MG/1
12.5 TABLET, FILM COATED ORAL NIGHTLY
Status: DISCONTINUED | OUTPATIENT
Start: 2020-10-11 | End: 2020-10-11

## 2020-10-11 RX ORDER — SODIUM CHLORIDE, SODIUM LACTATE, POTASSIUM CHLORIDE, CALCIUM CHLORIDE 600; 310; 30; 20 MG/100ML; MG/100ML; MG/100ML; MG/100ML
INJECTION, SOLUTION INTRAVENOUS ONCE
Status: COMPLETED | OUTPATIENT
Start: 2020-10-11 | End: 2020-10-11

## 2020-10-11 RX ORDER — VELPATASVIR AND SOFOSBUVIR 100; 400 MG/1; MG/1
1 TABLET, FILM COATED ORAL DAILY
COMMUNITY
End: 2021-02-25

## 2020-10-11 RX ORDER — 0.9 % SODIUM CHLORIDE 0.9 %
1000 INTRAVENOUS SOLUTION INTRAVENOUS ONCE
Status: COMPLETED | OUTPATIENT
Start: 2020-10-11 | End: 2020-10-11

## 2020-10-11 RX ORDER — TRAZODONE HYDROCHLORIDE 50 MG/1
50 TABLET ORAL NIGHTLY PRN
Status: DISCONTINUED | OUTPATIENT
Start: 2020-10-11 | End: 2020-10-12 | Stop reason: HOSPADM

## 2020-10-11 RX ORDER — KETOROLAC TROMETHAMINE 30 MG/ML
30 INJECTION, SOLUTION INTRAMUSCULAR; INTRAVENOUS EVERY 6 HOURS PRN
Status: DISCONTINUED | OUTPATIENT
Start: 2020-10-11 | End: 2020-10-12 | Stop reason: HOSPADM

## 2020-10-11 RX ORDER — ACETAMINOPHEN 325 MG/1
650 TABLET ORAL EVERY 4 HOURS PRN
Status: DISCONTINUED | OUTPATIENT
Start: 2020-10-11 | End: 2020-10-12 | Stop reason: HOSPADM

## 2020-10-11 RX ORDER — PANTOPRAZOLE SODIUM 40 MG/10ML
40 INJECTION, POWDER, LYOPHILIZED, FOR SOLUTION INTRAVENOUS 2 TIMES DAILY
Status: DISCONTINUED | OUTPATIENT
Start: 2020-10-11 | End: 2020-10-12 | Stop reason: HOSPADM

## 2020-10-11 RX ORDER — SUCRALFATE 1 G/1
1 TABLET ORAL EVERY 6 HOURS SCHEDULED
Status: DISCONTINUED | OUTPATIENT
Start: 2020-10-11 | End: 2020-10-12 | Stop reason: HOSPADM

## 2020-10-11 RX ORDER — DULOXETIN HYDROCHLORIDE 60 MG/1
60 CAPSULE, DELAYED RELEASE ORAL DAILY
Status: DISCONTINUED | OUTPATIENT
Start: 2020-10-11 | End: 2020-10-11

## 2020-10-11 RX ADMIN — IOPAMIDOL 80 ML: 755 INJECTION, SOLUTION INTRAVENOUS at 11:02

## 2020-10-11 RX ADMIN — KETOROLAC TROMETHAMINE 30 MG: 30 INJECTION, SOLUTION INTRAMUSCULAR; INTRAVENOUS at 11:07

## 2020-10-11 RX ADMIN — SODIUM CHLORIDE 1000 ML: 9 INJECTION, SOLUTION INTRAVENOUS at 09:21

## 2020-10-11 RX ADMIN — FAMOTIDINE 20 MG: 10 INJECTION INTRAVENOUS at 09:20

## 2020-10-11 RX ADMIN — SODIUM CHLORIDE: 9 INJECTION, SOLUTION INTRAVENOUS at 23:34

## 2020-10-11 RX ADMIN — ONDANSETRON 4 MG: 2 INJECTION INTRAMUSCULAR; INTRAVENOUS at 09:20

## 2020-10-11 RX ADMIN — SODIUM CHLORIDE, POTASSIUM CHLORIDE, SODIUM LACTATE AND CALCIUM CHLORIDE 1 ML: 600; 310; 30; 20 INJECTION, SOLUTION INTRAVENOUS at 11:10

## 2020-10-11 RX ADMIN — PANTOPRAZOLE SODIUM 40 MG: 40 INJECTION, POWDER, FOR SOLUTION INTRAVENOUS at 23:33

## 2020-10-11 RX ADMIN — SUCRALFATE 1 G: 1 TABLET ORAL at 23:33

## 2020-10-11 ASSESSMENT — ENCOUNTER SYMPTOMS
SHORTNESS OF BREATH: 1
CONSTIPATION: 0
DIARRHEA: 0
RHINORRHEA: 0
SORE THROAT: 0
NAUSEA: 1
ABDOMINAL DISTENTION: 0
COUGH: 0
ABDOMINAL PAIN: 1
PHOTOPHOBIA: 0
BACK PAIN: 0
VOMITING: 1

## 2020-10-11 ASSESSMENT — PAIN DESCRIPTION - DESCRIPTORS: DESCRIPTORS: ACHING;SHARP

## 2020-10-11 ASSESSMENT — PAIN SCALES - GENERAL
PAINLEVEL_OUTOF10: 0
PAINLEVEL_OUTOF10: 7
PAINLEVEL_OUTOF10: 0
PAINLEVEL_OUTOF10: 10

## 2020-10-11 ASSESSMENT — PAIN DESCRIPTION - PROGRESSION: CLINICAL_PROGRESSION: GRADUALLY WORSENING

## 2020-10-11 ASSESSMENT — PAIN DESCRIPTION - FREQUENCY: FREQUENCY: CONTINUOUS

## 2020-10-11 ASSESSMENT — PAIN DESCRIPTION - LOCATION: LOCATION: ABDOMEN

## 2020-10-11 ASSESSMENT — PAIN DESCRIPTION - ORIENTATION: ORIENTATION: RIGHT

## 2020-10-11 ASSESSMENT — PAIN DESCRIPTION - ONSET: ONSET: GRADUAL

## 2020-10-11 ASSESSMENT — PAIN DESCRIPTION - PAIN TYPE: TYPE: ACUTE PAIN

## 2020-10-11 NOTE — ED NOTES
Pt up in bed with blankets over bottom half. Patient updated on plan of care at this time and expresses no concerns regarding treatment. Patient VSS. Respirations are regular and unlabored, patient is alert and oriented x4. Patient bed rails up x2 and call light within reach. Will continue to monitor. Mother at the bedside.        Murray Eldridge RN  10/11/20 [id number]

## 2020-10-11 NOTE — ED NOTES
Patient lying in bed with blankets watching tv at this time. Patient respirations are regular and unlabored. Patient appears to be in no current distress. Patient VSS. Call light is within reach. Patient expresses no needs at this time.        Socorro Jade RN  10/11/20 [id number]

## 2020-10-11 NOTE — ED PROVIDER NOTES
[facility] [address] [address] EMERGENCY DEPT  Emergency Department  Attending Physician Attestation       I performed a history and physical examination of the patient and discussed management with the Resident/Trainee. I reviewed the trainee's note and agree with the documented findings and plan of care. Any areas of disagreement are noted on the chart. I was personally present for the key portions of any procedures. I have documented in the chart those procedures where I was not present during the key portions. I have reviewed the emergency nurses triage note. I agree with the chief complaint, past medical history, past surgical history, allergies, medications, social and family history as documented unless otherwise noted below. For Physician Assistant/ Nurse Practitioner cases I have personally evaluated this patient and have completed at least one if not all key elements of the E/M (history, physical exam, and MDM). Additional findings are as noted. Chief Complaint      Tony Davidson is a 23 y.o. male  Who drank alcohol and 2 \"sips\" of moonshine last night, here with some vomiting. Denies any other coingestants. Vehimently denies any other form of alcohol use, including household products, isopropanol, etc (toxic alcohol forms). Workup here reveals significant gap metabolic acidosis with an osmol gap as well. Pt states feels better and without complaints.           Irasema Coma Scale  Eye Opening: Spontaneous  Best Verbal Response: Oriented  Best Motor Response: Obeys commands  [place] Coma Scale Score: 15  Vitals:    Vitals:    10/11/20 1308 10/11/20 1340 10/11/20 1408 10/11/20 1508   BP: (!) 103/36  (!) 101/46 (!) 94/46   Pulse: 81  87 79   Resp: 22  26 13   Temp:       TempSrc:       SpO2: 99% 100% 100% 99%   Weight:       Height:           System Problem List     Patient Active Problem List   Diagnosis    Anxiety    Panic disorder    History of depression    Substance induced mood disorder ([facility] [address]    Bipolar II disorder ([facility] [address]    Substance-induced psychotic disorder ([facility] [address]    Polysubstance dependence including opioid type drug, episodic abuse ([facility] [address]       Pertinent Physical Exam:    Gen:     Non-toxic, well appearing  Head:   AT/NC              EOMI, ZAINA              Oropharynx Clear, mucous membranes moist  Neck:   Supple, no meningismus; No LAD  Chest:  CTAB    HRRR no mcg  Abd:     SNT/ND  Extrem: No edema, neg homans. Neuro:   Alert, Awake, no lateralizing deficts               CN's grossly intact bilaterally    DIAGNOSTIC RESULTS     RADIOLOGY:   CT ABDOMEN PELVIS W IV CONTRAST Additional Contrast? None   Final Result       1. Mild diffuse fatty replacement in the liver. Borderline splenomegaly. 2. Small hiatal hernia. 3. Mild prominence of the prostate gland. 4. Otherwise negative CT scan of the abdomen and pelvis. .               **This report has been created using voice recognition software. It may contain minor errors which are inherent in voice recognition technology. **      Final report electronically signed by DR Kaiser Camargo on 10/11/2020 11:33 AM            LABS:  Labs Reviewed   CBC WITH AUTO DIFFERENTIAL - Abnormal; Notable for the following components:       Result Value    WBC 16.0 (*)     MCHC 35.9 (*)     Segs Absolute 11.6 (*)     All other components within normal limits   COMPREHENSIVE METABOLIC PANEL - Abnormal; Notable for the following components:    Glucose 63 (*)     Sodium 146 (*)     CO2 12 (*)     Alb 5.2 (*)     All other components within normal limits   ANION GAP - Abnormal; Notable for the following components:    Anion Gap 31.0 (*)     All other components within normal limits   SALICYLATE LEVEL - Abnormal; Notable for the following components:    Salicylate, Serum 0.4 (*)     All other components within normal limits   LACTIC ACID, PLASMA - Abnormal; Notable for the following components:    Lactic Acid 3.4 (*)     All other components within normal limits   BLOOD GAS, VENOUS - Abnormal; Notable for the following components:    PCO2, MIXED VENOUS 33 (*)     PO2, Mixed 48 (*)     HCO3, Mixed 18 (*)     Base Exc, Mixed -6.6 (*)     All other components within normal limits   OSMOLALITY, SERUM - Abnormal; Notable for the following components:    Osmolality 323 (*)     All other components within normal limits   LIPASE   ETHANOL   URINE DRUG SCREEN   GLOMERULAR FILTRATION RATE, ESTIMATED   OSMOLALITY   ACETAMINOPHEN LEVEL   URINE VOLATILES SCREEN   BASIC METABOLIC PANEL W/ REFLEX TO MG FOR LOW K   POCT GLUCOSE         EMERGENCY DEPARTMENT COURSE:     BP: (!) 94/46, Temp: 98.5 °F (36.9 °C), Pulse: 79, Resp: 13    ED Course as of Oct 11 1608   Sun Oct 11, 2020   1606 Ddiscussed w/ Dr Ghada Santizo. Calculations reviewed. Measured serum osmoles 323, calculated serum osmole, doing it via the formula with accounting for EtOH is approximately 320, making the osmole gap minimal.  Despite this, patient does have significant ingestion and will require observation. Dr. Ghada Santizo suggested holding off with the fomepizole at this time    [AB]      ED Course User Index  [AB] Heaven Askew DO       Medical Decision-Making:      Will hold off with fomepizole  Volatile panel sent  hospitalist at the bedside to admit    Carmencita Witt DO, [address]  Attending Emergency Physician    (Please note that portions of this note were completed with a voice recognition program.  Efforts were made to edit the dictations but occasionally words are mis-transcribed.)        Heaven Askew DO  10/19/20 6313

## 2020-10-11 NOTE — ED NOTES
Patient lying in bed with blankets. Patient respirations are regular and unlabored. Patient appears to be in no current distress. Patient VSS. Call light is within reach. Patient expresses no needs at this time. Mother at the bedside.        Bunnie Hammans, RN  10/11/20 3531

## 2020-10-11 NOTE — ED NOTES
ED to inpatient nurses report    Chief Complaint   Patient presents with    Abdominal Pain      Present to ED from home  LOC: alert and orientated to name, place, date  Vital signs   Vitals:    10/11/20 1340 10/11/20 1408 10/11/20 1508 10/11/20 1553   BP:  (!) 101/46 (!) 94/46 128/87   Pulse:  87 79 114   Resp:  26 13 16   Temp:       TempSrc:       SpO2: 100% 100% 99% 100%   Weight:       Height:          Oxygen Baseline RA    Current needs required NONE Bipap/Cpap No  LDAs:   Peripheral IV 10/11/20 Left Forearm (Active)   Site Assessment Clean;Dry; Intact 10/11/20 0842   Line Status Blood return noted;Normal saline locked; Flushed 10/11/20 0842   Dressing Status Clean;Dry; Intact 10/11/20 0842     Mobility: Independent  Pending ED orders: Complete  Present condition: Stable    Electronically signed by Solomon Cody RN on 10/11/2020 at 4:43 PM     Solomon Cody, [address] [place] [address]  10/11/20 [address] [place] [address]

## 2020-10-11 NOTE — ED NOTES
Pt up in bed with blankets over bottom half. Patient updated on plan of care at this time and expresses no concerns regarding treatment. Patient VSS. Respirations are regular and unlabored, patient is alert and oriented x4. Patient bed rails up x2 and call light within reach. Will continue to monitor. Mother at the bedside.        Jacky France RN  10/11/20 6278

## 2020-10-11 NOTE — ED NOTES
Patient lying in bed with blankets watching tv at this time. Patient respirations are regular and unlabored. Patient appears to be in no current distress. Patient VSS. Call light is within reach. Patient expresses no needs at this time.        Srinivas Walker RN  10/11/20 0433

## 2020-10-11 NOTE — ED NOTES
Pt is transported to [facility] without incident. Floor nurse was contacted prior to departure.

## 2020-10-11 NOTE — H&P
History & Physical        Patient:  Nicolle Nesbitt  YOB: 2000    MRN: [id number]   Acct:  [de-identified]   Primary Care Physician: No primary care provider on file. Chief Complaint:  Abdominal pain    History of Present Illness:   History obtained from chart review and the patient. The patient is a 23 y.o. male who presented to [facility] with abd pain. Pain with h/O hepatitis C, bipolar disorder with multi substance abuse. Last admission to [facility] was in 2018 for threat to self. Patient reported severe diffuse sharp abdominal pain, mostly in RUQ/epigastrium. +N/V. He reported smoking marijuana and drinking beer (Linette lite), 7 white claws and \"two sips\" homemade moonshine. He reported mild SOB and headache. + palpitation but no chest pain. Patient was cooperative. Reported pain much better. Denies any intention of self harm. Past Medical History:        Diagnosis Date    Anxiety     Hepatitis C     History of depression        Past Surgical History:        Procedure Laterality Date    HARDWARE REMOVAL Right 11/27/2013    Knee    TIBIA FRACTURE SURGERY  3/28/2013       Home Medications:    Prior to Admission medications    Medication Sig Start Date End Date Taking? Authorizing Provider   ondansetron (ZOFRAN ODT) 4 MG disintegrating tablet Take 1 tablet by mouth every 8 hours as needed for Nausea 7/15/19   Sonya Hudson PA-C   DULoxetine (CYMBALTA) 60 MG extended release capsule Take 1 capsule by mouth daily 10/31/18   Duke Metz, DO   QUEtiapine (SEROQUEL) 25 MG tablet Take 0.5 tablets by mouth nightly 10/31/18   Duke De Guzmanbs, DO   traZODone (DESYREL) 50 MG tablet Take 1 tablet by mouth nightly as needed for Sleep 10/31/18   Duke De Guzmanbs, DO   acetaminophen (TYLENOL) 500 MG tablet Take 1,000 mg by mouth every 6 hours as needed. Historical Provider, MD   multivitamin [facility]) per tablet Take 1 tablet by mouth daily.     Historical Provider, MD     Allergies:  Patient has no known allergies. Social History:    reports that he has been smoking cigarettes. He has a 0.50 pack-year smoking history. He has never used smokeless tobacco. He reports current alcohol use. He reports current drug use. Drugs: Methamphetamines, Marijuana, IV, and Cocaine. Family History:   Reviewed:       Problem Relation Age of Onset    Diabetes Father         type 1    Cancer Father         Stomach cancer as an infant   [facility] Breast Cancer Mother 62    Other Mother         RAMA     Colon Cancer Neg Hx     Prostate Cancer Neg Hx        Review of Systems:    Pertinent items are noted in HPI. Comprehensive review of systems was obtained . Review of Systems   Constitutional: Negative for fever and chills. no Fatigue. HENT: Negative for ear pain, sore throat, rhinorrhea and neck pain. Eyes: Negative for pain, discharge and visual disturbance. Respiratory: Negative for cough, . no  wheezing. Cardiovascular: Negative for chest pain, palpitations and leg swelling. Gastrointestinal: No diarrhea. Genitourinary: Negative for dysuria,  difficulty urinating and pelvic pain. Musculoskeletal: . Negative for back pain, joint swelling and arthralgias. Skin: Negative for rash. Neurological: Negative for dizziness, seizures, syncope and headaches. Hematological: Negative for adenopathy. Psychiatric/Behavioral: Negative for suicidal ideas and dysphoric mood. The patient is not nervous/anxious. Physical Exam:  /87   Pulse 114   Temp 98.5 °F (36.9 °C) (Oral)   Resp 16   Ht 5' 11\" (1.803 m)   Wt 150 lb (68 kg)   SpO2 100%   BMI 20.92 kg/m²   General appearance:  No apparent distress, appears stated age and cooperative. HEENT:  Normal cephalic, atraumatic without obvious deformity. Pupils equal, round, and reactive to light. Extra ocular muscles intact. Conjunctivae/corneas clear. Neck: Supple, with full range of motion.  No jugular venous distention. Trachea midline. Respiratory:  Normal respiratory effort. Decreased BS at bases bilaterally  without Rales/Wheezes/Rhonchi. Cardiovascular:  Regular rate and regular regular rhythm with normal S1/S2 without murmurs, rubs or gallops. Abdomen: Soft, non-tender, non-distended with normal bowel sounds. Musculoskeletal:  No clubbing, cyanosis. No edema bilaterally. Full range of motion without deformity. Skin: Skin color, texture, turgor normal.  No rashes or lesions. Neurologic:  Neurovascularly intact without any focal sensory/motor deficits. grossly non-focal.  Psychiatric:  Alert and oriented, thought content appropriate, normal insight  Capillary Refill: Brisk,< 3 seconds   Peripheral Pulses: +2 palpable, equal bilaterally     Review of Labs and Diagnostic Testing:  Labs:     Recent Labs     10/11/20  0845   WBC 16.0*   HGB 16.6   HCT 46.3        Recent Labs     10/11/20  0845   *   K 3.6      CO2 12*   BUN 15   CREATININE 1.0   CALCIUM 10.2     Recent Labs     10/11/20  0845   AST 24   ALT 23   BILITOT 0.9   ALKPHOS 70     No results found for: AMYLASE  No results for input(s): INR in the last 72 hours. No results for input(s): TROPONINT in the last 72 hours. No results for input(s): BNP in the last 72 hours. Recent Labs     10/11/20  1327   LACTA 3.4*     No results found for: PROCAL  No results found for: LABA1C  Lab Results   Component Value Date    TSH 3.460 07/15/2019       Urinalysis:   Lab Results   Component Value Date    NITRU NEGATIVE 07/15/2019    BLOODU NEGATIVE 07/15/2019    GLUCOSEU NEGATIVE 07/15/2019     Radiology:   Reviewed: see report for details  CXR: I have reviewed the report  EKG:  No acute changes:   CT ABDOMEN PELVIS W IV CONTRAST Additional Contrast? None   Final Result       1. Mild diffuse fatty replacement in the liver. Borderline splenomegaly. 2. Small hiatal hernia. 3. Mild prominence of the prostate gland.    4. Otherwise negative CT scan of the abdomen and pelvis. .               **This report has been created using voice recognition software. It may contain minor errors which are inherent in voice recognition technology. **      Final report electronically signed by DR Brandie Gonzalez on 10/11/2020 11:33 AM         Code Status: Prior    Assessment/Plan:  Active Problems:    1- Toxic effect of alcohol, accidental, init/abd pain/N/V: no evidence of intentional self-harm: \"home made moonshine\": GI prophylaxis, IVF, Tele, repeat labs     2-Hep C: under treatment of Dr. Artemio Casey    3-Bipolar: appears stable: continue home meds.         DVT prophylaxis: [x] Lovenox                                 [] SCDs                                 [] SQ Heparin                                 [] Encourage ambulation           [] Already on Anticoagulation    Simone Tolbert MD on 10/11/2020 at [address] [place] [address] Street

## 2020-10-11 NOTE — ED NOTES
Pt up in bed with blankets over bottom half. Patient updated on plan of care at this time and expresses no concerns regarding treatment. Patient VSS. Respirations are regular and unlabored, patient is alert and oriented x4. Patient bed rails up x2 and call light within reach. Will continue to monitor. Mother at the bedside.        Solomon Cody RN  10/11/20 1794

## 2020-10-11 NOTE — ED NOTES
Patient lying in bed with blankets, mother at the bedside. Patient respirations are regular and unlabored. Patient appears to be in no current distress. Patient VSS. Call light is within reach. Patient expresses no needs at this time.        Reinier Bravo RN  10/11/20 2046

## 2020-10-11 NOTE — ED NOTES
Pt resting in bed. Assumed pt care at this time. Resp regular. Denies needs. Updated on bed status. Call light in reach.      Kelsea Adams RN  10/11/20 3642

## 2020-10-11 NOTE — ED PROVIDER NOTES
[facility] EMERGENCY DEPT      CHIEF COMPLAINT       Chief Complaint   Patient presents with    Abdominal Pain       Nurses Notes reviewed and I agree except as noted in the HPI. HISTORY OF PRESENT ILLNESS    Adriana Choudhary is a 23 y.o. male with a history of Hepatitis C who presents for abdominal pain. Patient presents with mother who says that patient awoke this morning around 5 AM with significant abdominal pain and nausea/vomiting. Patient is rolling on cot yelling that he says is due to his pain. Mom states that the patient has vomited every 15-20 minutes since being awake. It is a clear or bile colored emesis. Patient endorses generalized abdominal pain with slightly more pain in the RUQ. Patient was at a party at his brother's yesterday and said he had 7 white claws, 2 beers, 2 jugs of homemade pineapple moonshine, and smoked marijuana. Patient has had similar symptoms from drinking too much alcohol in the past, but states this is worse. He denied any other drug use but has a history of opioid abuse. He says he had a bowel movement yesterday. Patient says that he feels SOB, headache, and feels like his heart is racing. Patient denies fever, chills, chest pain, back pain, weakness, numbness, tingling, constipation, diarrhea, frequency, urgency or testicular/groin pain. He tried to take a Tylenol this morning that he threw back up, per mom. Location/Symptom: Abdominal pain  Timing/Onset: Sudden   Context/Setting: Awoke from sleep  Quality: Sharp  Duration: 3-4 hours  Modifying Factors: None  Severity: Patient rolling in bed    REVIEW OF SYSTEMS     Review of Systems   Constitutional: Negative for appetite change, chills and fever. HENT: Negative for congestion, ear pain, rhinorrhea and sore throat. Eyes: Negative for photophobia. Respiratory: Positive for shortness of breath. Negative for cough. Cardiovascular: Negative for chest pain.         Feels heart racing   Gastrointestinal: Positive for abdominal pain, nausea and vomiting. Negative for abdominal distention, constipation and diarrhea. Endocrine: Negative for polyuria. Genitourinary: Negative for difficulty urinating, dysuria, flank pain, frequency, penile pain, scrotal swelling, testicular pain and urgency. Musculoskeletal: Negative for back pain and gait problem. Skin: Negative for rash. Neurological: Positive for headaches. Negative for dizziness, weakness and numbness. Psychiatric/Behavioral: Positive for behavioral problems. Negative for confusion and sleep disturbance. PAST MEDICAL HISTORY    has a past medical history of Anxiety, Hepatitis C, and History of depression. SURGICAL HISTORY      has a past surgical history that includes Tibia fracture surgery (3/28/2013) and Hardware Removal (Right, 11/27/2013). CURRENT MEDICATIONS       Discharge Medication List as of 10/12/2020 10:57 AM      CONTINUE these medications which have NOT CHANGED    Details   NONFORMULARY Kratom (herbal supplement) 3-6 capsules/day as needed. Historical Med      Sofosbuvir-Velpatasvir (EPCLUSA) 400-100 MG TABS Take 1 tablet by mouth dailyHistorical Med      acetaminophen (TYLENOL) 500 MG tablet Take 1,000 mg by mouth every 6 hours as needed. multivitamin (THERAGRAN) per tablet Take 1 tablet by mouth daily. ALLERGIES     has No Known Allergies. FAMILY HISTORY     He indicated that his mother is alive. He indicated that his father is alive. He indicated that the status of his neg hx is unknown.   family history includes Breast Cancer (age of onset: 62) in his mother; Cancer in his father; Diabetes in his father; Other in his mother. SOCIAL HISTORY    reports that he has been smoking cigarettes. He has a 0.50 pack-year smoking history. He has never used smokeless tobacco. He reports current alcohol use. He reports current drug use. Drug: Marijuana.     PHYSICAL EXAM     INITIAL VITALS:  height is 5' 10\" (1.778 m) and weight is 149 lb 12.8 oz (67.9 kg). His oral temperature is 98.2 °F (36.8 °C). His blood pressure is 138/75 and his pulse is 66. His respiration is 16 and oxygen saturation is 100%. Physical Exam  Vitals signs and nursing note reviewed. Constitutional:       General: He is not in acute distress. Appearance: Normal appearance. He is well-developed. He is not toxic-appearing or diaphoretic. HENT:      Head: Normocephalic and atraumatic. Right Ear: Hearing normal.      Left Ear: Hearing normal.      Nose: Nose normal. No rhinorrhea. Mouth/Throat:      Lips: Pink. Mouth: Mucous membranes are moist.      Pharynx: Uvula midline. Eyes:      General: Lids are normal. No scleral icterus. Extraocular Movements: Extraocular movements intact. Conjunctiva/sclera: Conjunctivae normal.      Pupils: Pupils are equal, round, and reactive to light. Neck:      Musculoskeletal: No neck rigidity or muscular tenderness. Trachea: Trachea normal. No tracheal deviation. Cardiovascular:      Rate and Rhythm: Normal rate and regular rhythm. Heart sounds: Normal heart sounds. No murmur. Pulmonary:      Effort: Pulmonary effort is normal. No respiratory distress. Breath sounds: Normal breath sounds. No stridor. No decreased breath sounds or wheezing. Abdominal:      General: Abdomen is flat. Bowel sounds are normal. There is no distension. Palpations: Abdomen is soft. Abdomen is not rigid. There is no hepatomegaly or pulsatile mass. Tenderness: There is generalized abdominal tenderness (patient pulled away when lightly palpating abdomen). There is no right CVA tenderness, left CVA tenderness, guarding or rebound. Negative signs include Serrano's sign and McBurney's sign. Hernia: No hernia is present. Comments: On reexam there is no abdominal tenderness with distraction   Musculoskeletal: Normal range of motion.       Comments: Movement normal as observed [facility] on 10/11/2020 11:33 AM          LABS:   Labs Reviewed   CBC WITH AUTO DIFFERENTIAL - Abnormal; Notable for the following components:       Result Value    WBC 16.0 (*)     MCHC 35.9 (*)     Segs Absolute 11.6 (*)     All other components within normal limits   COMPREHENSIVE METABOLIC PANEL - Abnormal; Notable for the following components:    Glucose 63 (*)     Sodium 146 (*)     CO2 12 (*)     Alb 5.2 (*)     All other components within normal limits   ANION GAP - Abnormal; Notable for the following components:    Anion Gap 31.0 (*)     All other components within normal limits   SALICYLATE LEVEL - Abnormal; Notable for the following components:    Salicylate, Serum 0.4 (*)     All other components within normal limits   LACTIC ACID, PLASMA - Abnormal; Notable for the following components:    Lactic Acid 3.4 (*)     All other components within normal limits   BLOOD GAS, VENOUS - Abnormal; Notable for the following components:    PCO2, MIXED VENOUS 33 (*)     PO2, Mixed 48 (*)     HCO3, Mixed 18 (*)     Base Exc, Mixed -6.6 (*)     All other components within normal limits   OSMOLALITY, SERUM - Abnormal; Notable for the following components:    Osmolality 323 (*)     All other components within normal limits   BASIC METABOLIC PANEL W/ REFLEX TO MG FOR LOW K - Abnormal; Notable for the following components:    CO2 17 (*)     All other components within normal limits   ANION GAP - Abnormal; Notable for the following components:    Anion Gap 18.0 (*)     All other components within normal limits   OSMOLALITY - Abnormal; Notable for the following components:    Osmolality Calc 270.7 (*)     All other components within normal limits   BASIC METABOLIC PANEL - Abnormal; Notable for the following components:    CO2 21 (*)     All other components within normal limits   BASIC METABOLIC PANEL - Abnormal; Notable for the following components:    Glucose 137 (*)     All other components within normal limits   CBC - Abnormal; Notable for the following components:    RBC 4.55 (*)     Hematocrit 41.8 (*)     All other components within normal limits   LIPASE   ETHANOL   URINE DRUG SCREEN   GLOMERULAR FILTRATION RATE, ESTIMATED   OSMOLALITY   ACETAMINOPHEN LEVEL   GLOMERULAR FILTRATION RATE, ESTIMATED   MAGNESIUM   TROPONIN   AMMONIA   LACTIC ACID, PLASMA   LACTIC ACID, PLASMA   ANION GAP   GLOMERULAR FILTRATION RATE, ESTIMATED   ANION GAP   GLOMERULAR FILTRATION RATE, ESTIMATED   URINE VOLATILES SCREEN   POCT GLUCOSE       EMERGENCY DEPARTMENT COURSE:   Vitals:    Vitals:    10/11/20 1947 10/11/20 2332 10/12/20 0347 10/12/20 0837   BP: (!) 117/58 (!) 113/59 127/64 138/75   Pulse: 72 69 71 66   Resp: 18 18 18 16   Temp: 98.7 °F (37.1 °C) 98 °F (36.7 °C) 98.1 °F (36.7 °C) 98.2 °F (36.8 °C)   TempSrc: Oral Oral Oral Oral   SpO2: 99% 98% 99% 100%   Weight: 149 lb 12.8 oz (67.9 kg)      Height: 5' 10\" (1.778 m)        1531: Consulted Dr. Luis Whaley in regards to admission    MDM:  The patient was seen by me in the emergency room for nausea, vomiting, and abdominal pain after drinking alcohol and smoking marijuana. Physical exam revealed a 20-year-old male moaning and writhing on the bed. Abdomen was soft with generalized tenderness but no peritoneal signs. Vital signs reviewed and noted stable. Old records were reviewed. Appropriate laboratory and imaging studies were ordered and reviewed upon completion. Pertinent findings: Leukocytosis 16, CO2 of 12 glucose 63, lactic acid 3.4 serum osmolality 323    Interventions: Normal saline, Zofran, Reglan, Pepcid. Fomepizole considered    I discussed this patient with my attending physician, Dr. Dakota Arreola, who aided in medical decision making. There was concern for toxic alcohol poisoning due to osmolality gap. Patient denies any ingestion consistent, although the moonshine was homemade. Dr. Dakota Arreola discussed giving the patient fomepizole with Dr. Shaan Leach.   Was decided to hold off for now. Results were discussed with the patient and mother as well as desire for admission and they were amenable. Dr. Pao Ochoa of our hospitalists' service was consulted and graciously accepted admission. The patient was admitted to the hospital in fair condition. CRITICAL CARE:   During my workup of this patient, it did become clear to me the critical nature of this patient's illness which did require my constant attention, and a critical care time 60 minutes was given    CONSULTS:  Dr. Pao Ochoa (hospitalist)    PROCEDURES:  None    FINAL IMPRESSION      1. Epigastric pain    2. Non-intractable vomiting with nausea, unspecified vomiting type    3. Toxic effect of alcohol, unintentional, initial encounter          DISPOSITION/PLAN     1. Epigastric pain    2. Non-intractable vomiting with nausea, unspecified vomiting type    3.  Toxic effect of alcohol, unintentional, initial encounter    Admission      (Please note that portions of this note were completed with a voice recognition program.  Efforts were made to edit the dictations but occasionally words are mis-transcribed.)    Jessie Goode PA-C 10/13/20 10:08 AM    TOÑITO Saavedra PA-C  10/13/20 [id number]

## 2020-10-12 VITALS
RESPIRATION RATE: 16 BRPM | WEIGHT: 149.8 LBS | HEART RATE: 66 BPM | SYSTOLIC BLOOD PRESSURE: 138 MMHG | HEIGHT: 70 IN | DIASTOLIC BLOOD PRESSURE: 75 MMHG | OXYGEN SATURATION: 100 % | BODY MASS INDEX: 21.45 KG/M2 | TEMPERATURE: 98.2 F

## 2020-10-12 LAB
AMMONIA: 17 UMOL/L (ref 11–60)
ANION GAP SERPL CALCULATED.3IONS-SCNC: 14 MEQ/L (ref 8–16)
BUN BLDV-MCNC: 15 MG/DL (ref 7–22)
CALCIUM SERPL-MCNC: 8.8 MG/DL (ref 8.5–10.5)
CHLORIDE BLD-SCNC: 105 MEQ/L (ref 98–111)
CO2: 21 MEQ/L (ref 23–33)
CREAT SERPL-MCNC: 0.9 MG/DL (ref 0.4–1.2)
ERYTHROCYTE [DISTWIDTH] IN BLOOD BY AUTOMATED COUNT: 12.8 % (ref 11.5–14.5)
ERYTHROCYTE [DISTWIDTH] IN BLOOD BY AUTOMATED COUNT: 42.9 FL (ref 35–45)
GFR SERPL CREATININE-BSD FRML MDRD: > 90 ML/MIN/1.73M2
GLUCOSE BLD-MCNC: 88 MG/DL (ref 70–108)
HCT VFR BLD CALC: 41.8 % (ref 42–52)
HEMOGLOBIN: 14.6 GM/DL (ref 14–18)
LACTIC ACID: 0.7 MMOL/L (ref 0.5–2.2)
MAGNESIUM: 1.9 MG/DL (ref 1.6–2.4)
MCH RBC QN AUTO: 32.1 PG (ref 26–33)
MCHC RBC AUTO-ENTMCNC: 34.9 GM/DL (ref 32.2–35.5)
MCV RBC AUTO: 91.9 FL (ref 80–94)
PLATELET # BLD: 172 THOU/MM3 (ref 130–400)
PMV BLD AUTO: 11.1 FL (ref 9.4–12.4)
POTASSIUM SERPL-SCNC: 4.6 MEQ/L (ref 3.5–5.2)
RBC # BLD: 4.55 MILL/MM3 (ref 4.7–6.1)
SODIUM BLD-SCNC: 140 MEQ/L (ref 135–145)
TROPONIN T: < 0.01 NG/ML
WBC # BLD: 5 THOU/MM3 (ref 4.8–10.8)

## 2020-10-12 PROCEDURE — 2580000003 HC RX 258: Performed by: OPHTHALMOLOGY

## 2020-10-12 PROCEDURE — 96376 TX/PRO/DX INJ SAME DRUG ADON: CPT

## 2020-10-12 PROCEDURE — G0378 HOSPITAL OBSERVATION PER HR: HCPCS

## 2020-10-12 PROCEDURE — 6370000000 HC RX 637 (ALT 250 FOR IP): Performed by: OPHTHALMOLOGY

## 2020-10-12 PROCEDURE — 36415 COLL VENOUS BLD VENIPUNCTURE: CPT

## 2020-10-12 PROCEDURE — 6360000002 HC RX W HCPCS: Performed by: OPHTHALMOLOGY

## 2020-10-12 PROCEDURE — 83605 ASSAY OF LACTIC ACID: CPT

## 2020-10-12 PROCEDURE — C9113 INJ PANTOPRAZOLE SODIUM, VIA: HCPCS | Performed by: OPHTHALMOLOGY

## 2020-10-12 PROCEDURE — 83735 ASSAY OF MAGNESIUM: CPT

## 2020-10-12 PROCEDURE — 82140 ASSAY OF AMMONIA: CPT

## 2020-10-12 PROCEDURE — 85027 COMPLETE CBC AUTOMATED: CPT

## 2020-10-12 PROCEDURE — 80048 BASIC METABOLIC PNL TOTAL CA: CPT

## 2020-10-12 PROCEDURE — 99217 PR OBSERVATION CARE DISCHARGE MANAGEMENT: CPT | Performed by: PHYSICIAN ASSISTANT

## 2020-10-12 PROCEDURE — 84484 ASSAY OF TROPONIN QUANT: CPT

## 2020-10-12 RX ADMIN — SODIUM CHLORIDE: 9 INJECTION, SOLUTION INTRAVENOUS at 07:38

## 2020-10-12 RX ADMIN — PANTOPRAZOLE SODIUM 40 MG: 40 INJECTION, POWDER, FOR SOLUTION INTRAVENOUS at 08:55

## 2020-10-12 RX ADMIN — THERA TABS 1 TABLET: TAB at 08:55

## 2020-10-12 RX ADMIN — SUCRALFATE 1 G: 1 TABLET ORAL at 05:52

## 2020-10-12 ASSESSMENT — PAIN SCALES - GENERAL
PAINLEVEL_OUTOF10: 0
PAINLEVEL_OUTOF10: 0

## 2020-10-12 NOTE — PROGRESS NOTES
Discharge teaching and instructions for diagnosis/procedure of alcohol ingestion completed with patient using teachback method. AVS reviewed. Patient voiced understanding regarding prescriptions, follow up appointments, and care of self at home. Discharged ambulatory to  home with support per family.

## 2020-10-12 NOTE — PROGRESS NOTES
Pt admitted to  6K10 in a wheelchair and from ED. Complaints: toxic effect of unspecified type of alcohol, accidental.    IV none infusing into the forearm left, condition patent and no redness at a rate of 0 mls/ hour with about 0 mls in the bag still. IV site free of s/s of infection or infiltration. Vital signs obtained. Assessment and data collection initiated. Two nurse skin assessment performed by Giovani Monroe RN and Gurpreet RN. Oriented to room. Policies and procedures for 6[facility] explained. Sharon POLK discussed hourly rounding with patient addressing 5 P's. Fall prevention and safety brochure discussed with patient. Bed alarm on. Call light in reach. The best day to schedule a follow up Dr appointment is:  Thursday a.m. Explained patients right to have family, representative or physician notified of their admission. Patient has Declined for physician to be notified. Patient has Declined for family/representative to be notified. All questions answered with no further questions at this time. Which family member is the designated  [facility] number In Chart  Do you want them contacted on admission and updated every shift Declined.

## 2020-10-12 NOTE — PLAN OF CARE
Problem: Discharge Planning:  Goal: Participates in care planning  Outcome: Ongoing  Note: Discharge is pending at this time. Goal: Discharged to appropriate level of care  Outcome: Ongoing     Problem: Fluid Volume - Deficit:  Goal: Absence of fluid volume deficit signs and symptoms  Outcome: Ongoing  Note: NS running at 125ml/hr. Goal: Electrolytes within specified parameters  Outcome: Ongoing     Problem: Pain:  Goal: Pain level will decrease  Outcome: Ongoing  Note: No c/o pain at this time. Goal: Ability to notify healthcare provider of pain before it becomes unmanageable or unbearable will improve  Outcome: Ongoing  Goal: Control of acute pain  Outcome: Ongoing  Goal: Control of chronic pain  Outcome: Ongoing     Problem: Skin Integrity - Impaired:  Goal: Will show no infection signs and symptoms  Outcome: Ongoing  Note: No major skin issues upon arriving to the floor. See Flowsheet. Goal: Absence of new skin breakdown  Outcome: Ongoing     Problem: Falls - Risk of:  Goal: Will remain free from falls  Outcome: Ongoing  Note:   The patient has been free of falls this shift. Bed is in low position, 2/4 rails are up, and alarm is active. Call light is in reach, 2/4 rails are up, and hourly rounding performed.    Goal: Absence of physical injury  Outcome: Ongoing

## 2020-10-13 NOTE — DISCHARGE SUMMARY
Hospitalist Discharge Summary    Patient: Roxana Moncada  YOB: 2000  MRN: [id number]   Acct: [de-identified]    Primary Care Physician: No primary care provider on file. Admit date  10/11/2020    Discharge date:  10/12/2020  Disposition: Home      Discharge Assessment and Plan:    1. Toxic effect of alcohol, accidental: No evidence of intentional self harm. Counseling offered. Treated with aggressive IVF. Resolved. 2. Chronic hepatitis C: under treatment of Dr. Ted Bloom    3. Hx Bipolar: stable, continue home meds. Chief Complaint on presentation: abdominal pain    Initial H&P / Hospital Course: Initial HPI: \"The patient is a 23 y.o. male who presented to [address] [place] [address] with abd pain. Pain with h/O hepatitis C, bipolar disorder with multi substance abuse. Last admission to [facility] was in 2018 for threat to self. Patient reported severe diffuse sharp abdominal pain, mostly in RUQ/epigastrium. +N/V. He reported smoking marijuana and drinking beer (Linette lite), 7 white claws and \"two sips\" homemade moonshine. He reported mild SOB and headache. + palpitation but no chest pain. Patient was cooperative. Reported pain much better. Denies any intention of self harm. \"     Subjective (day of discharge): Patient is doing well. He states his abdominal pain, n/v have resolved. Denies fever/chills, sob, cp. Counseled on effects of alcohol and marijuana. Patient responded well to medical management and is discharged in stable condition with appropriate follow up. Physical Exam:-  Vitals: No data found. Weight: Weight: 149 lb 12.8 oz (67.9 kg)   24 hour intake/output: No intake or output data in the 24 hours ending 10/13/20 1602    General appearance: No apparent distress, appears stated age and cooperative. HEENT: Normal cephalic, atraumatic without obvious deformity. Pupils equal, round, and reactive to light. Extra ocular muscles intact.  Conjunctivae/corneas clear.  Neck: Supple, with full range of motion. No jugular venous distention. Trachea midline. Respiratory:  Normal respiratory effort. Clear to auscultation, bilaterally without Rales/Wheezes/Rhonchi. Cardiovascular: Regular rate and rhythm with normal S1/S2 without murmurs, rubs or gallops. Abdomen: Soft, non-tender, non-distended with normal bowel sounds. Musculoskeletal:  No clubbing, cyanosis or edema bilaterally. Skin: Skin color, texture, turgor normal.  No rashes or lesions. Neurologic:  Neurovascularly intact without any focal sensory/motor deficits.  Cranial nerves: II-XII intact, grossly non-focal.  Psychiatric: Alert and oriented, thought content appropriate, normal insight  Capillary Refill: Brisk,< 3 seconds   Peripheral Pulses: +2 palpable, equal bilaterally     Labs :  Recent Results (from the past 72 hour(s))   Osmolality, Serum    Collection Time: 10/11/20  8:05 AM   Result Value Ref Range    Osmolality 323 (H) 275 - 295 mosmol/kg   CBC auto differential    Collection Time: 10/11/20  8:45 AM   Result Value Ref Range    WBC 16.0 (H) 4.8 - 10.8 thou/mm3    RBC 5.16 4.70 - 6.10 mill/mm3    Hemoglobin 16.6 14.0 - 18.0 gm/dl    Hematocrit 46.3 42.0 - 52.0 %    MCV 89.7 80.0 - 94.0 fL    MCH 32.2 26.0 - 33.0 pg    MCHC 35.9 (H) 32.2 - 35.5 gm/dl    RDW-CV 12.5 11.5 - 14.5 %    RDW-SD 41.1 35.0 - 45.0 fL    Platelets 997 384 - 697 thou/mm3    MPV 11.1 9.4 - 12.4 fL    Seg Neutrophils 72.2 %    Lymphocytes 22.0 %    Monocytes 4.5 %    Eosinophils 0.4 %    Basophils 0.5 %    Immature Granulocytes 0.4 %    Segs Absolute 11.6 (H) 1.8 - 7.7 thou/mm3    Lymphocytes Absolute 3.5 1.0 - 4.8 thou/mm3    Monocytes Absolute 0.7 0.4 - 1.3 thou/mm3    Eosinophils Absolute 0.1 0.0 - 0.4 thou/mm3    Basophils Absolute 0.1 0.0 - 0.1 thou/mm3    Immature Grans (Abs) 0.06 0.00 - 0.07 thou/mm3    nRBC 0 /100 wbc   Comprehensive Metabolic Panel    Collection Time: 10/11/20  8:45 AM   Result Value Ref Range    Glucose 63 (L) 70 - 108 mg/dL    CREATININE 1.0 0.4 - 1.2 mg/dL    BUN 15 7 - 22 mg/dL    Sodium 146 (H) 135 - 145 meq/L    Potassium 3.6 3.5 - 5.2 meq/L    Chloride 103 98 - 111 meq/L    CO2 12 (L) 23 - 33 meq/L    Calcium 10.2 8.5 - 10.5 mg/dL    AST 24 5 - 40 U/L    Alkaline Phosphatase 70 38 - 126 U/L    Total Protein 8.0 6.1 - 8.0 g/dL    Alb 5.2 (H) 3.5 - 5.1 g/dL    Total Bilirubin 0.9 0.3 - 1.2 mg/dL    ALT 23 11 - 66 U/L   Lipase    Collection Time: 10/11/20  8:45 AM   Result Value Ref Range    Lipase 11.1 5.6 - 51.3 U/L   Ethanol    Collection Time: 10/11/20  8:45 AM   Result Value Ref Range    ETHYL ALCOHOL, SERUM 0.08 0.00 %   Anion Gap    Collection Time: 10/11/20  8:45 AM   Result Value Ref Range    Anion Gap 31.0 (H) 8.0 - 16.0 meq/L   Glomerular Filtration Rate, Estimated    Collection Time: 10/11/20  8:45 AM   Result Value Ref Range    Est, Glom Filt Rate >90 ml/min/1.73m2   Osmolality    Collection Time: 10/11/20  8:45 AM   Result Value Ref Range    Osmolality Calc 289.4 275.0 - 300.0 mOsmol/kg   EKG 12 Lead    Collection Time: 10/11/20  9:34 AM   Result Value Ref Range    Ventricular Rate 97 BPM    Atrial Rate 97 BPM    P-R Interval 122 ms    QRS Duration 90 ms    Q-T Interval 358 ms    QTc Calculation (Bazett) 454 ms    P Axis 73 degrees    R Axis 67 degrees    T Axis 60 degrees   Urine Drug Screen    Collection Time: 10/11/20 12:07 PM   Result Value Ref Range    AMPHETAMINE+METHAMPHETAMINE URINE SCREEN Negative NEGATIVE    Barbiturate Quant, Ur Negative NEGATIVE    Benzodiazepine Quant, Ur Negative NEGATIVE    Cannabinoid Quant, Ur POSITIVE NEGATIVE    Cocaine Metab Quant, Ur Negative NEGATIVE    Opiates, Urine Negative NEGATIVE    Oxycodone Negative NEGATIVE    PCP Quant, Ur Negative NEGATIVE   Acetaminophen level    Collection Time: 10/11/20  1:27 PM   Result Value Ref Range    Acetaminophen Level < 5.0 0.0 - 23.5 ug/mL   Salicylate Level    Collection Time: 10/11/20  1:27 PM   Result Value Ref Range Salicylate, Serum 0.4 (L) 2.0 - 10.0 mg/dL   Lactic acid, plasma    Collection Time: 10/11/20  1:27 PM   Result Value Ref Range    Lactic Acid 3.4 (H) 0.5 - 2.2 mmol/L   Blood gas, venous    Collection Time: 10/11/20  1:44 PM   Result Value Ref Range    PH MIXED 7.35 7.31 - 7.41    PCO2, MIXED VENOUS 33 (L) 41 - 51 mmhg    PO2, Mixed 48 (H) 25 - 40 mmhg    HCO3, Mixed 18 (L) 23 - 28 mmol/l    Base Exc, Mixed -6.6 (L) -2.0 - 3.0 mmol/l    O2 Sat, Mixed 82 %    COLLECTED BY: [id number]    POCT Glucose    Collection Time: 10/11/20  3:25 PM   Result Value Ref Range    POC Glucose 74 70 - 108 mg/dl   Basic Metabolic Panel w/ Reflex to MG    Collection Time: 10/11/20  4:15 PM   Result Value Ref Range    Sodium 135 135 - 145 meq/L    Potassium reflex Magnesium 4.7 3.5 - 5.2 meq/L    Chloride 100 98 - 111 meq/L    CO2 17 (L) 23 - 33 meq/L    Glucose 75 70 - 108 mg/dL    BUN 18 7 - 22 mg/dL    CREATININE 0.9 0.4 - 1.2 mg/dL    Calcium 9.5 8.5 - 10.5 mg/dL   Anion Gap    Collection Time: 10/11/20  4:15 PM   Result Value Ref Range    Anion Gap 18.0 (H) 8.0 - 16.0 meq/L   Glomerular Filtration Rate, Estimated    Collection Time: 10/11/20  4:15 PM   Result Value Ref Range    Est, Glom Filt Rate >90 ml/min/1.73m2   Osmolality    Collection Time: 10/11/20  4:15 PM   Result Value Ref Range    Osmolality Calc 270.7 (L) 275.0 - 300.0 mOsmol/kg   Lactic Acid, Plasma    Collection Time: 10/11/20  8:44 PM   Result Value Ref Range    Lactic Acid 1.4 0.5 - 2.2 mmol/L   Basic Metabolic Panel    Collection Time: 10/11/20  8:44 PM   Result Value Ref Range    Sodium 135 135 - 145 meq/L    Potassium 4.4 3.5 - 5.2 meq/L    Chloride 101 98 - 111 meq/L    CO2 24 23 - 33 meq/L    Glucose 137 (H) 70 - 108 mg/dL    BUN 20 7 - 22 mg/dL    CREATININE 0.9 0.4 - 1.2 mg/dL    Calcium 8.8 8.5 - 10.5 mg/dL   Anion Gap    Collection Time: 10/11/20  8:44 PM   Result Value Ref Range    Anion Gap 10.0 8.0 - 16.0 meq/L   Glomerular Filtration Rate, Estimated Collection Time: 10/11/20  8:44 PM   Result Value Ref Range    Est, Glom Filt Rate >90 XN/DEE/6.09X9   Basic Metabolic Panel    Collection Time: 10/12/20  6:08 AM   Result Value Ref Range    Sodium 140 135 - 145 meq/L    Potassium 4.6 3.5 - 5.2 meq/L    Chloride 105 98 - 111 meq/L    CO2 21 (L) 23 - 33 meq/L    Glucose 88 70 - 108 mg/dL    BUN 15 7 - 22 mg/dL    CREATININE 0.9 0.4 - 1.2 mg/dL    Calcium 8.8 8.5 - 10.5 mg/dL   Magnesium    Collection Time: 10/12/20  6:08 AM   Result Value Ref Range    Magnesium 1.9 1.6 - 2.4 mg/dL   Troponin    Collection Time: 10/12/20  6:08 AM   Result Value Ref Range    Troponin T < 0.010 ng/ml   Ammonia    Collection Time: 10/12/20  6:08 AM   Result Value Ref Range    Ammonia 17 11 - 60 umol/L   Lactic Acid, Plasma    Collection Time: 10/12/20  6:08 AM   Result Value Ref Range    Lactic Acid 0.7 0.5 - 2.2 mmol/L   CBC    Collection Time: 10/12/20  6:08 AM   Result Value Ref Range    WBC 5.0 4.8 - 10.8 thou/mm3    RBC 4.55 (L) 4.70 - 6.10 mill/mm3    Hemoglobin 14.6 14.0 - 18.0 gm/dl    Hematocrit 41.8 (L) 42.0 - 52.0 %    MCV 91.9 80.0 - 94.0 fL    MCH 32.1 26.0 - 33.0 pg    MCHC 34.9 32.2 - 35.5 gm/dl    RDW-CV 12.8 11.5 - 14.5 %    RDW-SD 42.9 35.0 - 45.0 fL    Platelets 607 242 - 705 thou/mm3    MPV 11.1 9.4 - 12.4 fL   Anion Gap    Collection Time: 10/12/20  6:08 AM   Result Value Ref Range    Anion Gap 14.0 8.0 - 16.0 meq/L   Glomerular Filtration Rate, Estimated    Collection Time: 10/12/20  6:08 AM   Result Value Ref Range    Est, Glom Filt Rate >90 ml/min/1.73m2        Microbiology:    Blood culture #1: No results found for: BC  Blood culture #2:No results found for: BLOODCULT2  Organism:  No results found for: LABGRAM  MRSA culture only:No results found for: [address] [place] [address]  Urine culture: No results found for: LABURIN  No results found for: ORG   Respiratory culture: No results found for: CULTRESP  Aerobic and Anaerobic :  No results found for: LABAERO  No results found for: LABANAE    Urinalysis:     Lab Results   Component Value Date    NITRU NEGATIVE 07/15/2019    BLOODU NEGATIVE 07/15/2019    GLUCOSEU NEGATIVE 07/15/2019       Radiology:  Ct Abdomen Pelvis W Iv Contrast Additional Contrast? None    Result Date: 10/11/2020  PROCEDURE: CT ABDOMEN PELVIS W IV CONTRAST CLINICAL INFORMATION: upper abd pain, n,v . COMPARISON: Ultrasound scan dated 26 May 2020 TECHNIQUE: Axial 5 mm CT images were obtained through the abdomen and pelvis after the administration of intravenous contrast. Coronal and sagittal reconstructions were obtained. All CT scans at this facility use dose modulation, iterative reconstruction, and/or weight-based dosing when appropriate to reduce radiation dose to as low as reasonably achievable. FINDINGS: The visualized aspects of the lung bases are clear. The base of the heart is within appropriate limits. There is mild diffuse fatty replacement in the liver there is borderline splenomegaly. The adrenals and pancreas are normal. The gallbladder is normal.    There is no hydronephrosis or stones of either kidney. No renal masses are noted. There is a small hiatal hernia No abnormalities of the small bowel loops are noted. The IVC and aorta are of normal caliber. There is no adenopathy. The urinary bladder is normal. The prostate gland measures 4.2 x 3.8 cm in size There is no pelvic free fluid. The colon is within normal limits. There is no adenopathy. No suspicious osseous lesions are identified. 1. Mild diffuse fatty replacement in the liver. Borderline splenomegaly. 2. Small hiatal hernia. 3. Mild prominence of the prostate gland. 4. Otherwise negative CT scan of the abdomen and pelvis. . **This report has been created using voice recognition software. It may contain minor errors which are inherent in voice recognition technology. ** Final report electronically signed by DR Alexander Winter on 10/11/2020 11:33 AM       Consults:   None    Discharge Medications:

## 2020-10-14 LAB — VOLATILES SCREEN URINE: NORMAL

## 2021-02-24 PROBLEM — F41.9 ANXIETY: Chronic | Status: RESOLVED | Noted: 2018-09-04 | Resolved: 2021-02-24

## 2021-02-24 PROBLEM — F41.0 PANIC DISORDER: Chronic | Status: RESOLVED | Noted: 2018-09-04 | Resolved: 2021-02-24

## 2021-02-24 NOTE — PROGRESS NOTES
Chief Complaint   Patient presents with    Other     Bipolar    Hepatitis C    Nicotine Dependence       History obtained from the patient. SUBJECTIVE:  Maddi Woodard is a 23 y.o. male that presents today for     -Mood d/o:  Long-term issue  Dx 2 years ago with bipolar 2 and substance abuse induced mood d/o  Was admitted in 2018 for this  Never f/u with any psych rec's  Stopped all meds a while ago  Has been clean from substances for 1.5 years now  Moods issues persist  Has extensive intrusive thoughts  No hallucinations  Hypomanic type sxs  No depression, but anxious d/t the intrusive throughts  No SI/HI      -HCV:  Recently treated, just completed with Dr. Yasmin Pan  No RUQ pain or jaundice      -Smoking:  Not ready to quit yet      Age/Gender Health Maintenance    Lipid - age 34  DM Screen - age 34  Colon Cancer Screening - age 48  Lung Cancer Screening (Age 54 to [de-identified] with 27 pack year hx, current smoker or quit within past 13 years) - age 54 if meets criteria    Tetanus - UTD June 2011  Influenza Vaccine - candidate FALL 2021  Pneumonia Vaccine - age 72  Zostavax - age 46     PSA testing discussion - age 52  AAA Screening - age 70 if smoked    Falls screening - n/a      Current Outpatient Medications   Medication Sig Dispense Refill    risperiDONE (RISPERDAL) 2 MG tablet Take 1 tablet by mouth every evening 30 tablet 0    hydrOXYzine (ATARAX) 25 MG tablet Take 1-2 tablets by mouth every 8 hours as needed for Anxiety 60 tablet 0    acetaminophen (TYLENOL) 500 MG tablet Take 1,000 mg by mouth every 6 hours as needed.  multivitamin (THERAGRAN) per tablet Take 1 tablet by mouth daily. No current facility-administered medications for this visit.       Orders Placed This Encounter   Medications    risperiDONE (RISPERDAL) 2 MG tablet     Sig: Take 1 tablet by mouth every evening     Dispense:  30 tablet     Refill:  0    hydrOXYzine (ATARAX) 25 MG tablet     Sig: Take 1-2 tablets by mouth every 8 hours as needed for Anxiety     Dispense:  60 tablet     Refill:  0         All medications reviewed and reconciled, including OTC and herbal medications. Updated list given to patient. Patient Active Problem List    Diagnosis Date Noted    Hepatitis C     Nicotine dependence     Bipolar II disorder ([facility]     Substance-induced psychotic disorder ([facility]     Polysubstance dependence including opioid type drug, episodic abuse ([facility]     Substance induced mood disorder ([facility] 09/29/2018       Past Medical History:   Diagnosis Date    Bipolar II disorder ([facility]     Hepatitis C     Nicotine dependence          Past Surgical History:   Procedure Laterality Date    HARDWARE REMOVAL Right 11/27/2013    Knee    TIBIA FRACTURE SURGERY  3/28/2013         No Known Allergies      Social History     Tobacco Use    Smoking status: Current Every Day Smoker     Packs/day: 0.25     Years: 2.00     Pack years: 0.50     Types: Cigarettes    Smokeless tobacco: Never Used   Substance Use Topics    Alcohol use: Yes     Comment: 8  weekly        Family History   Problem Relation Age of Onset    Diabetes Father         type 1    Cancer Father         Stomach cancer as an infant   [facility] Breast Cancer Mother 62    Other Mother         RAMA     Colon Cancer Neg Hx     Prostate Cancer Neg Hx          I have reviewed the patient's past medical history, past surgical history, allergies, medications, social and family history and I have made updates where appropriate.       Review of Systems  Positive responses are highlighted in bold    Constitutional:  Fever, Chills, Night Sweats, Fatigue, Unexpected changes in weight  HENT:  Ear pain, Tinnitus, Nosebleeds, Trouble swallowing, Hearing loss, Sore throat  Cardiovascular:  Chest Pain, Palpitations, Orthopnea, Paroxysmal Nocturnal Dyspnea  Respiratory:  Cough, Wheezing, Shortness of breath, Chest tightness, Apnea  Gastrointestinal:  Nausea, Vomiting, Diarrhea, Constipation, Heartburn, Blood in stool  Genitourinary:  Difficulty or painful urination, Flank pain, Change in frequency, Urgency  Skin:  Color change, Rash, Itching, Wound  Musculoskeletal:  Joint pain, Back pain, Gait problems, Joint swelling, Myalgias  Neurological:  Dizziness, Headaches, Presyncope, Numbness, Seizures, Tremors  Endocrine:  Heat Intolerance, Cold Intolerance, Polydipsia, Polyphagia, Polyuria      PHYSICAL EXAM:  Vitals:    02/25/21 1521   BP: 128/78   Pulse: 64   Resp: 18   Temp: 98.4 °F (36.9 °C)   TempSrc: Temporal   SpO2: 99%   Weight: 153 lb 6.4 oz (69.6 kg)   Height: 5' 10\" (1.778 m)     Body mass index is 22.01 kg/m². Pain Score:   0 - No pain    VS Reviewed  General Appearance: A&O x 3, No acute distress,well developed and well- nourished  Eyes: pupils equal, round, and reactive to light, extraocular eye movements intact, conjunctivae and eye lids without erythema  ENT: external ear and ear canal clear bilaterally, TMs intact and regular, nose without deformity, nasal mucosa and turbinates normal without polyps, oropharynx normal, dentition is normal for age  Neck: supple and non-tender without mass, no thyromegaly or thyroid nodules, no cervical lymphadenopathy  Pulmonary/Chest: clear to auscultation bilaterally- no wheezes, rales or rhonchi, normal air movement, no respiratory distress or retractions  Cardiovascular: S1 and S2 auscultated w/ RRR. No murmurs, rubs, clicks, or gallops, distal pulses intact. Abdomen: soft, non-tender, non-distended, bowel sounds physiologic,  no rebound or guarding, no masses or hernias noted. Liver and spleen without enlargement. Extremities: no cyanosis, clubbing or edema of the lower extremities. Skin: warm and dry, no rash or erythema  Psych: Affect elevated. Mood labile. Thought process is normal without evidence of psychosis. Good insight and appropriate interaction. Cognition and memory appear to be intact. ASSESSMENT & PLAN  1.  Bipolar II disorder [facility])    Complicated case  Quite a bit of intrusive thoughts and hypomania  No SI/HI    Plan:  Refer to psych for long-term management, his case is a challenging one for family medicine  Start risperidone 2mg qhs and prn atarax  F/u 1 wk    - risperiDONE (RISPERDAL) 2 MG tablet; Take 1 tablet by mouth every evening  Dispense: 30 tablet; Refill: 0  - hydrOXYzine (ATARAX) 25 MG tablet; Take 1-2 tablets by mouth every 8 hours as needed for Anxiety  Dispense: 60 tablet; Refill: 0  - Emily Ortiz MD, Psychiatry, [address]    2. Chronic hepatitis C without hepatic coma (HCC)    In remission  Just completed meds  F/u GI  Monitor     3. Cigarette nicotine dependence without complication    In precontemplation stage and not ready to quit. Declines cessation, aware of risks of continued smoking as well as resources available to help quit. These include tobacco cessation classes as well as 1-800-QUIT NOW. No barriers other than lack of desire. 3+ min spent counseling. DISPOSITION    Return in about 1 week (around 3/4/2021) for f/u mood disorder, sooner as needed. Edenilson Cherrymann released without restrictions. PATIENT COUNSELING    Counseling was provided today regarding the following topics: Healthy eating habits, Regular exercise, substance abuse and healthy sleep habits. Edenilson Contreras received counseling on the following healthy behaviors: nutrition, exercise and medication adherence    Patient given educational materials on: See Attached    Barriers to learning and self management: none    Discussed use, benefit, and side effects of prescribed medications. Barriers to medication compliance addressed. All patient questions answered. Pt voiced understanding.        Electronically signed by Armond Mix DO on 2/25/2021 at 4:26 PM

## 2021-02-25 ENCOUNTER — OFFICE VISIT (OUTPATIENT)
Dept: FAMILY MEDICINE CLINIC | Age: 21
End: 2021-02-25
Payer: COMMERCIAL

## 2021-02-25 VITALS
TEMPERATURE: 98.4 F | HEIGHT: 70 IN | HEART RATE: 64 BPM | BODY MASS INDEX: 21.96 KG/M2 | OXYGEN SATURATION: 99 % | DIASTOLIC BLOOD PRESSURE: 78 MMHG | SYSTOLIC BLOOD PRESSURE: 128 MMHG | RESPIRATION RATE: 18 BRPM | WEIGHT: 153.4 LBS

## 2021-02-25 DIAGNOSIS — B18.2 CHRONIC HEPATITIS C WITHOUT HEPATIC COMA (HCC): Chronic | ICD-10-CM

## 2021-02-25 DIAGNOSIS — F17.210 CIGARETTE NICOTINE DEPENDENCE WITHOUT COMPLICATION: Chronic | ICD-10-CM

## 2021-02-25 DIAGNOSIS — F31.81 BIPOLAR II DISORDER (HCC): Primary | ICD-10-CM

## 2021-02-25 PROBLEM — T51.91XA: Status: RESOLVED | Noted: 2020-10-11 | Resolved: 2021-02-25

## 2021-02-25 PROCEDURE — 99214 OFFICE O/P EST MOD 30 MIN: CPT | Performed by: FAMILY MEDICINE

## 2021-02-25 RX ORDER — RISPERIDONE 2 MG/1
2 TABLET, FILM COATED ORAL EVERY EVENING
Qty: 30 TABLET | Refills: 0 | Status: SHIPPED | OUTPATIENT
Start: 2021-02-25 | End: 2021-02-26

## 2021-02-25 RX ORDER — HYDROXYZINE HYDROCHLORIDE 25 MG/1
25-50 TABLET, FILM COATED ORAL EVERY 8 HOURS PRN
Qty: 60 TABLET | Refills: 0 | Status: SHIPPED | OUTPATIENT
Start: 2021-02-25 | End: 2021-03-04

## 2021-02-25 NOTE — PATIENT INSTRUCTIONS
Patient Education        Learning About Mood Disorders  What are mood disorders? Mood disorders are medical problems that affect how you feel. They can impact your moods, thoughts, and actions. Mood disorders include:  · Depression. This causes you to feel sad or hopeless for much of the time. · Bipolar disorder. This causes extreme mood changes from manic episodes of very high energy to extreme lows of depression. · Seasonal affective disorder (SAD). This is a type of depression that affects you during the same season each year. Most often people experience SAD during the fall and winter months when days are shorter and there is less light. What are the symptoms? Depression  You may:  · Feel sad or hopeless nearly every day. · Lose interest in or not get pleasure from most daily activities. You feel this way nearly every day. · Have low energy, changes in your appetite, or changes in how well you sleep. · Have trouble concentrating. · Think about death and suicide. Keep the numbers for these national suicide hotlines: 3-089-891-TALK (9-224.580.2354) and 4-398-TMKRNJG (4-373.591.1690). If you or someone you know talks about suicide or feeling hopeless, get help right away. Bipolar disorder  Symptoms depend on your mood swings. You may:  · Feel very happy, energetic, or on edge. · Feel like you need very little sleep. · Feel overly self-confident. · Do impulsive things, such as spending a lot of money. · Feel sad or hopeless. · Have racing thoughts or trouble thinking and making decisions. · Lose interest in things you have enjoyed in the past.  · Think about death and suicide. Keep the numbers for these national suicide hotlines: 5-746-050-TALK (9-929-207-411.369.9423) and 5-909-GYPDOBP (5-601.937.4014). If you or someone you know talks about suicide or feeling hopeless, get help right away. Seasonal affective disorder (SAD)  Symptoms come and go at about the same time each year.  For most people with SAD, symptoms come during the winter when there is less daylight. You may:  · Feel sad, grumpy, adam, or anxious. · Lose interest in your usual activities. · Eat more and crave carbohydrates, such as bread and pasta. · Gain weight. · Sleep more and feel drowsy during the daytime. How are mood disorders treated? Mood disorders can be treated with medicines or counseling, or a combination of both. Medicines for depression and SAD may include antidepressants. Medicines for bipolar disorder may include:  · Mood stabilizers. · Antipsychotics. · Benzodiazepines. Counseling may involve cognitive-behavioral therapy. It teaches you how to change the ways you think and behave. This can help you stop thinking bad thoughts about yourself and your life. Light therapy is the main treatment for SAD. This therapy uses a special kind of lamp. You let the lamp shine on you at certain times, usually in the morning. This may help your symptoms during the months when there is less sunlight. Healthy lifestyle  Healthy lifestyle changes may help you feel better. · Be active often. You might try walking or strength training. · Eat a healthy diet. Include fruits, vegetables, lean proteins, and whole grains in your diet each day. · Keep a regular sleep schedule. Try for 8 hours of sleep a night. · Find ways to manage stress, such as relaxation exercises. · Avoid alcohol and illegal drugs. Follow-up care is a key part of your treatment and safety. Be sure to make and go to all appointments, and call your doctor if you are having problems. It's also a good idea to know your test results and keep a list of the medicines you take. Where can you learn more? Go to https://joe.[employer]. org and sign in to your [employer] account. Enter E376 in the [facility] box to learn more about \"Learning About Mood Disorders. \"     If you do not have an account, please click on the \"Sign Up Now\" link.   Current as of: January 31, 2020               Content Version: 12.6  © 4194-6225 [employer]. Care instructions adapted under license by [facility] ([facility]). If you have questions about a medical condition or this instruction, always ask your healthcare professional. [address] any warranty or liability for your use of this information.

## 2021-02-26 ENCOUNTER — TELEPHONE (OUTPATIENT)
Dept: FAMILY MEDICINE CLINIC | Age: 21
End: 2021-02-26

## 2021-02-26 DIAGNOSIS — F31.81 BIPOLAR II DISORDER (HCC): Primary | ICD-10-CM

## 2021-02-26 RX ORDER — QUETIAPINE FUMARATE 25 MG/1
TABLET, FILM COATED ORAL
Qty: 60 TABLET | Refills: 0 | Status: SHIPPED | OUTPATIENT
Start: 2021-02-26 | End: 2021-03-04

## 2021-02-26 NOTE — TELEPHONE ENCOUNTER
Diagnosis Orders   1.  Bipolar II disorder ([address]  QUEtiapine (SEROQUEL) 25 MG tablet     Medications Discontinued During This Encounter   Medication Reason    risperiDONE (RISPERDAL) 2 MG tablet Patient Choice     Future Appointments   Date Time Provider Joo Louie   3/4/2021  8:20 AM Brianda Moreira, [address] [place] [address]

## 2021-02-26 NOTE — TELEPHONE ENCOUNTER
Pt states he was reading about resperdal and the lawsuits out on the medication, and he doesn't want to take it.  He states he has taken Seroquel before and it worked good for him

## 2021-03-03 NOTE — PROGRESS NOTES
Chief Complaint   Patient presents with    Follow-up     bipoar d/t etc    Alcohol Problem    Other     Marijuana       History obtained from the patient. SUBJECTIVE:  Jordan Maguire is a 23 y.o. male that presents today for     -Mood d/o LAST VISIT:  Long-term issue  Dx 2 years ago with bipolar 2 and substance abuse induced mood d/o  Was admitted in 2018 for this  Never f/u with any psych rec's  Stopped all meds a while ago  Has been clean from substances for 1.5 years now  Moods issues persist  Has extensive intrusive thoughts  No hallucinations  Hypomanic type sxs  No depression, but anxious d/t the intrusive throughts  No SI/HI    UPDATE TODAY:   Here for 1 wk f/u  Started on risperdal last visit. However, when got home, decided didn't want to take it  Called and asked to resume seroquel, this was done  He took once. Didn't like it and stopped  Declines any and all meds at this point  Admits to smoking quite a lot of \"weed\" and drinking quite a bit  He denied both of these last visit  Feels less anxious today, less adam  Denies hallucinations  Discussed pyschology/therapy, declines  Does have psychiatry consult, awaiting approval  Denies SI/HI     Discussed marijuana and ETOH today, advised to quit both  Offered options for ETOH detox/rehab. Declines. States can quit on his own  Drinks 4 to 5 times per wk, 3-7 beers at a time  Was smoking marijuna daily.  Quit last wk, states wont' resume      Age/Gender Health Maintenance    Lipid - age 34  DM Screen - age 34  Colon Cancer Screening - age 48  Lung Cancer Screening (Age 54 to [de-identified] with 27 pack year hx, current smoker or quit within past 13 years) - age 54 if meets criteria    Tetanus - UTD June 2011  Influenza Vaccine - candidate FALL 2021  Pneumonia Vaccine - age 72  Zostavax - age 48     PSA testing discussion - age 52  AAA Screening - age 72 if smoked    Falls screening - n/a      Current Outpatient Medications   Medication Sig Dispense Refill    throat  Cardiovascular:  Chest Pain, Palpitations, Orthopnea, Paroxysmal Nocturnal Dyspnea  Respiratory:  Cough, Wheezing, Shortness of breath, Chest tightness, Apnea  Gastrointestinal:  Nausea, Vomiting, Diarrhea, Constipation, Heartburn, Blood in stool  Genitourinary:  Difficulty or painful urination, Flank pain, Change in frequency, Urgency  Skin:  Color change, Rash, Itching, Wound  Musculoskeletal:  Joint pain, Back pain, Gait problems, Joint swelling, Myalgias  Neurological:  Dizziness, Headaches, Presyncope, Numbness, Seizures, Tremors  Endocrine:  Heat Intolerance, Cold Intolerance, Polydipsia, Polyphagia, Polyuria      PHYSICAL EXAM:  Vitals:    03/04/21 0823   BP: 121/72   Pulse: 56   Resp: 14   Temp: 97.5 °F (36.4 °C)   TempSrc: Temporal   SpO2: 99%   Weight: 154 lb (69.9 kg)   Height: 5' 10\" (1.778 m)     Body mass index is 22.1 kg/m². Pain Score:   0 - No pain    VS Reviewed  General Appearance: A&O x 3, No acute distress,well developed and well- nourished  Eyes: pupils equal, round, and reactive to light, extraocular eye movements intact, conjunctivae and eye lids without erythema  ENT: external ear and ear canal clear bilaterally, TMs intact and regular, nose without deformity, nasal mucosa and turbinates normal without polyps, oropharynx normal, dentition is normal for age  Neck: supple and non-tender without mass, no thyromegaly or thyroid nodules, no cervical lymphadenopathy  Pulmonary/Chest: clear to auscultation bilaterally- no wheezes, rales or rhonchi, normal air movement, no respiratory distress or retractions  Cardiovascular: S1 and S2 auscultated w/ RRR. No murmurs, rubs, clicks, or gallops, distal pulses intact. Abdomen: soft, non-tender, non-distended, bowel sounds physiologic,  no rebound or guarding, no masses or hernias noted. Liver and spleen without enlargement. Extremities: no cyanosis, clubbing or edema of the lower extremities.    Skin: warm and dry, no rash or erythema  Psych: Affect elevated. Mood labile. Thought process is normal without evidence of psychosis. Good insight and appropriate interaction. Cognition and memory appear to be intact. ASSESSMENT & PLAN  1. Bipolar II disorder ([address]    Difficult case  It's going to be hard to get him the help he needs, unfortunately    Clearly still having issues, but pt declines any treatment. He has capacity to make decisions. Advised that we should either try to titrate up on the seroquel or actually try the rispderdal, declines  Recommend therapy, declines    Plan: Will keep off meds for now, as he's refusing  Await psych consult  F/u 4 wks to reassess, sooner any changes    2. Alcohol problem drinking    Discussed in detail  Needs to quit, but clearly self medicating. Pt with limited insight  Advised to quit, offered help with rehab options, declines  Reassess 4 wks    3. Marijuana abuse    Discussed in detail  Needs to quit, but clearly self medicating. Pt with limited insight  F/u 4 wks      DISPOSITION    Return in about 4 weeks (around 4/1/2021) for f/u mood disorder, sooner as needed. True Head released without restrictions. Future Appointments   Date Time Provider Joo Louie   4/1/2021  8:00 AM [address]     PATIENT COUNSELING    Counseling was provided today regarding the following topics: Healthy eating habits, Regular exercise, substance abuse and healthy sleep habits. True Head received counseling on the following healthy behaviors: nutrition, exercise and medication adherence    Patient given educational materials on: See Attached    Barriers to learning and self management: none    Discussed use, benefit, and side effects of prescribed medications. Barriers to medication compliance addressed. All patient questions answered. Pt voiced understanding.        Electronically signed by Rony Suh DO on 3/4/2021 at 8:41 AM

## 2021-03-04 ENCOUNTER — OFFICE VISIT (OUTPATIENT)
Dept: FAMILY MEDICINE CLINIC | Age: 21
End: 2021-03-04
Payer: COMMERCIAL

## 2021-03-04 VITALS
HEART RATE: 56 BPM | DIASTOLIC BLOOD PRESSURE: 72 MMHG | HEIGHT: 70 IN | BODY MASS INDEX: 22.05 KG/M2 | WEIGHT: 154 LBS | OXYGEN SATURATION: 99 % | TEMPERATURE: 97.5 F | SYSTOLIC BLOOD PRESSURE: 121 MMHG | RESPIRATION RATE: 14 BRPM

## 2021-03-04 DIAGNOSIS — F12.10 MARIJUANA ABUSE: ICD-10-CM

## 2021-03-04 DIAGNOSIS — F31.81 BIPOLAR II DISORDER (HCC): Primary | ICD-10-CM

## 2021-03-04 DIAGNOSIS — F10.90 ALCOHOL PROBLEM DRINKING: ICD-10-CM

## 2021-03-04 PROCEDURE — 99214 OFFICE O/P EST MOD 30 MIN: CPT | Performed by: FAMILY MEDICINE

## 2021-03-04 NOTE — PATIENT INSTRUCTIONS
Patient Education        Bipolar Disorder: Care Instructions  Your Care Instructions     Bipolar disorder is an illness that causes extreme mood changes, from times of very high energy (manic episodes) to times of depression. But many people with bipolar disorder show only the symptoms of depression. These moods may cause problems with your work, school, family life, friendships, and how well you function. This disease is also called manic-depression. There is no cure for bipolar disorder, but it can be helped with medicines. Counseling may also help. It is important to take your medicines exactly as prescribed, even when you feel well. You may need lifelong treatment. Follow-up care is a key part of your treatment and safety. Be sure to make and go to all appointments, and call your doctor if you are having problems. It's also a good idea to know your test results and keep a list of the medicines you take. How can you care for yourself at home? · Be safe with medicines. Take your medicines exactly as prescribed. Do not stop or change a medicine without talking to your doctor first. Jayesh Chandler and your doctor may need to try different combinations of medicines to find what works for you. · Take your medicines on schedule to keep your moods even. When you feel good, you may think that you do not need your medicines. But it is important to keep taking them. · Go to your counseling sessions. Call and talk with your counselor if you can't go to a session or if you don't think the sessions are helping. Do not just stop going. · Get at least 30 minutes of activity on most days of the week. Walking is a good choice. You also may want to do other things, such as running, swimming, or cycling. · Get enough sleep. Keep your room dark and quiet. Try to go to bed at the same time every night. · Eat a healthy diet. This includes whole grains, dairy, fruits, vegetables, and protein.  Eat foods from each of these the numbers for these national suicide hotlines: 1-775-428-TALK (5-900.475.3610) and 6-418-TFBBNMG (6-923.779.7096). If a suicide threat seems real, with a specific plan and a way to carry it out, stay with the person, or ask someone you trust to stay with the person, until you can get help.     · Someone you know has bipolar disorder and:  ? Starts to give away possessions. ? Is using illegal drugs or drinking alcohol heavily. ? Talks or writes about death, including writing suicide notes or talking about guns, knives, or pills. ? Talks or writes about hurting someone else. ? Starts to spend a lot of time alone. ? Acts very aggressively or suddenly appears calm. ? Talks about beliefs that are not based in reality (delusions). Watch closely for changes in your health, and be sure to contact your doctor if:    · You cannot go to your counseling sessions. Where can you learn more? Go to [email or web address] org and sign in to your [employer] account. Enter K052 in the [facility] box to learn more about \"Bipolar Disorder: Care Instructions. \"     If you do not have an account, please click on the \"Sign Up Now\" link. Current as of: January 31, 2020               Content Version: 12.6  © 2280-1589 [employer]. Care instructions adapted under license by [facility] ([facility]). If you have questions about a medical condition or this instruction, always ask your healthcare professional. [place] [address] any warranty or liability for your use of this information.

## 2021-03-08 ENCOUNTER — TELEPHONE (OUTPATIENT)
Dept: FAMILY MEDICINE CLINIC | Age: 21
End: 2021-03-08

## 2021-03-08 NOTE — TELEPHONE ENCOUNTER
Please be advised. .. REFERRAL TO PSYCHIATRY-    Referral placed on 2/25/2021. Referral was given to Dr. Josh Weller for review. She has denied. See below:    Per Dr. Josh Weller after review of referral: My caseload is too full to accept. Would be more appropriate for [facility] as still heavy alcohol use per chart review. *referral has been denied. Referral was cancelled. Patient was contacted and informed. He is aware of [facility].

## 2022-11-28 NOTE — ED NOTES
Pt up in bed with blankets over bottom half. Patient updated on plan of care at this time and expresses no concerns regarding treatment. Patient VSS. Respirations are regular and unlabored, patient is alert and oriented x4. Patient bed rails up x2 and call light within reach. Will continue to monitor. Mother at the bedside.        Cira Mata RN  10/11/20 2462 Electrodesiccation And Curettage Text: The wound bed was treated with electrodesiccation and curettage after the biopsy was performed.

## 2023-06-01 ENCOUNTER — TELEPHONE (OUTPATIENT)
Dept: FAMILY MEDICINE CLINIC | Age: 23
End: 2023-06-01

## 2023-06-01 NOTE — TELEPHONE ENCOUNTER
Pt was evaluated at [facility], was prescribed the suboxone but insurance will not cover due to the facility being out of network. Pt is wanting to know if  will fill the soboxon for him. Attempted to contact pt to ask why he was needing the soboxone.

## 2023-06-01 NOTE — TELEPHONE ENCOUNTER
----- Message from Droid system master sent at 6/1/2023  8:20 AM EDT -----  Subject: Message to Provider    QUESTIONS  Information for Provider? Pt would like to know if Provider Donovan Alexander   can prescribe Suboxone strips or tablets. Please reach out to pt for   additional information  ---------------------------------------------------------------------------  --------------  [facility]  [phone]; OK to leave message on voicemail  ---------------------------------------------------------------------------  --------------  SCRIPT ANSWERS  Relationship to Patient?  Self

## 2023-06-01 NOTE — TELEPHONE ENCOUNTER
That is not something I am able to do. Only certain providers are able to fill suboxone in the state of [place]. His options would be [facility] in our area. You can also call over to the IM clinic at [address] [place] [address] and see if anyone took over for Dr. Evy Pitt. We could also refer there if they are still taking Subooxne patients. Would need to know who to refer to over there if they do. Let us know and we can get that done tomorrow    Otherwise, he would have to f/u with [facility] if IM is no longer taking pts, or he would have to check with his insurance and see what Suboxone provider is in network close to here. However, I am not a Suboxone provider and can't legally prescribe it. Let me know if questions, thanks!

## 2023-07-21 ENCOUNTER — APPOINTMENT (OUTPATIENT)
Dept: GENERAL RADIOLOGY | Age: 23
End: 2023-07-21
Payer: COMMERCIAL

## 2023-07-21 ENCOUNTER — HOSPITAL ENCOUNTER (EMERGENCY)
Age: 23
Discharge: HOME | End: 2023-07-21
Payer: COMMERCIAL

## 2023-07-21 VITALS
BODY MASS INDEX: 24.34 KG/M2 | HEART RATE: 66 BPM | SYSTOLIC BLOOD PRESSURE: 130 MMHG | RESPIRATION RATE: 21 BRPM | HEIGHT: 70 IN | TEMPERATURE: 97.9 F | WEIGHT: 170 LBS | DIASTOLIC BLOOD PRESSURE: 71 MMHG | OXYGEN SATURATION: 99 %

## 2023-07-21 DIAGNOSIS — E86.0 DEHYDRATION: Primary | ICD-10-CM

## 2023-07-21 DIAGNOSIS — F14.10 NONDEPENDENT COCAINE ABUSE (HCC): ICD-10-CM

## 2023-07-21 LAB
ALBUMIN SERPL BCG-MCNC: 5.2 G/DL (ref 3.5–5.1)
ALP SERPL-CCNC: 76 U/L (ref 38–126)
ALT SERPL W/O P-5'-P-CCNC: 22 U/L (ref 11–66)
AMPHETAMINES UR QL SCN: POSITIVE
ANION GAP SERPL CALC-SCNC: 16 MEQ/L (ref 8–16)
AST SERPL-CCNC: 17 U/L (ref 5–40)
BARBITURATES UR QL SCN: NEGATIVE
BASOPHILS ABSOLUTE: 0 THOU/MM3 (ref 0–0.1)
BASOPHILS NFR BLD AUTO: 0.4 %
BENZODIAZ UR QL SCN: NEGATIVE
BILIRUB CONJ SERPL-MCNC: 0.4 MG/DL (ref 0–0.3)
BILIRUB SERPL-MCNC: 1.5 MG/DL (ref 0.3–1.2)
BILIRUB UR QL STRIP.AUTO: NEGATIVE
BUN SERPL-MCNC: 8 MG/DL (ref 7–22)
BZE UR QL SCN: POSITIVE
CALCIUM SERPL-MCNC: 9.8 MG/DL (ref 8.5–10.5)
CANNABINOIDS UR QL SCN: NEGATIVE
CHARACTER UR: CLEAR
CHLORIDE SERPL-SCNC: 98 MEQ/L (ref 98–111)
CO2 SERPL-SCNC: 20 MEQ/L (ref 23–33)
COLOR: YELLOW
CREAT SERPL-MCNC: 0.7 MG/DL (ref 0.4–1.2)
DEPRECATED RDW RBC AUTO: 38.8 FL (ref 35–45)
EOSINOPHIL NFR BLD AUTO: 0.3 %
EOSINOPHILS ABSOLUTE: 0 THOU/MM3 (ref 0–0.4)
ERYTHROCYTE [DISTWIDTH] IN BLOOD BY AUTOMATED COUNT: 12.1 % (ref 11.5–14.5)
FENTANYL: POSITIVE
GFR SERPL CREATININE-BSD FRML MDRD: > 60 ML/MIN/1.73M2
GLUCOSE SERPL-MCNC: 100 MG/DL (ref 70–108)
GLUCOSE UR QL STRIP.AUTO: NEGATIVE MG/DL
HCT VFR BLD AUTO: 49.1 % (ref 42–52)
HGB BLD-MCNC: 17.4 GM/DL (ref 14–18)
HGB UR QL STRIP.AUTO: NEGATIVE
IMM GRANULOCYTES # BLD AUTO: 0.01 THOU/MM3 (ref 0–0.07)
IMM GRANULOCYTES NFR BLD AUTO: 0.1 %
KETONES UR QL STRIP.AUTO: 15
LYMPHOCYTES ABSOLUTE: 2.1 THOU/MM3 (ref 1–4.8)
LYMPHOCYTES NFR BLD AUTO: 31 %
MAGNESIUM SERPL-MCNC: 2.5 MG/DL (ref 1.6–2.4)
MCH RBC QN AUTO: 31.2 PG (ref 26–33)
MCHC RBC AUTO-ENTMCNC: 35.4 GM/DL (ref 32.2–35.5)
MCV RBC AUTO: 88 FL (ref 80–94)
MONOCYTES ABSOLUTE: 0.5 THOU/MM3 (ref 0.4–1.3)
MONOCYTES NFR BLD AUTO: 7.2 %
NEUTROPHILS NFR BLD AUTO: 61 %
NITRITE UR QL STRIP: NEGATIVE
NRBC BLD AUTO-RTO: 0 /100 WBC
OPIATES UR QL SCN: NEGATIVE
OSMOLALITY SERPL CALC.SUM OF ELEC: 266.7 MOSMOL/KG (ref 275–300)
OXYCODONE, OPI5M: POSITIVE
PCP UR QL SCN: NEGATIVE
PH UR STRIP.AUTO: 6 [PH] (ref 5–9)
PLATELET # BLD AUTO: 269 THOU/MM3 (ref 130–400)
PMV BLD AUTO: 10.5 FL (ref 9.4–12.4)
POTASSIUM SERPL-SCNC: 3.6 MEQ/L (ref 3.5–5.2)
PROT SERPL-MCNC: 7.6 G/DL (ref 6.1–8)
PROT UR STRIP.AUTO-MCNC: NEGATIVE MG/DL
RBC # BLD AUTO: 5.58 MILL/MM3 (ref 4.7–6.1)
SEGMENTED NEUTROPHILS ABSOLUTE COUNT: 4.1 THOU/MM3 (ref 1.8–7.7)
SODIUM SERPL-SCNC: 134 MEQ/L (ref 135–145)
SP GR UR REFRACT.AUTO: 1.01 (ref 1–1.03)
TROPONIN, HIGH SENSITIVITY: 7 NG/L (ref 0–12)
UROBILINOGEN, URINE: 0.2 EU/DL (ref 0–1)
WBC # BLD AUTO: 6.8 THOU/MM3 (ref 4.8–10.8)
WBC #/AREA URNS HPF: NEGATIVE /[HPF]

## 2023-07-21 PROCEDURE — 84484 ASSAY OF TROPONIN QUANT: CPT

## 2023-07-21 PROCEDURE — 71045 X-RAY EXAM CHEST 1 VIEW: CPT

## 2023-07-21 PROCEDURE — 96360 HYDRATION IV INFUSION INIT: CPT

## 2023-07-21 PROCEDURE — 99285 EMERGENCY DEPT VISIT HI MDM: CPT

## 2023-07-21 PROCEDURE — 36415 COLL VENOUS BLD VENIPUNCTURE: CPT

## 2023-07-21 PROCEDURE — 80307 DRUG TEST PRSMV CHEM ANLYZR: CPT

## 2023-07-21 PROCEDURE — 93005 ELECTROCARDIOGRAM TRACING: CPT | Performed by: EMERGENCY MEDICINE

## 2023-07-21 PROCEDURE — 82248 BILIRUBIN DIRECT: CPT

## 2023-07-21 PROCEDURE — 85025 COMPLETE CBC W/AUTO DIFF WBC: CPT

## 2023-07-21 PROCEDURE — 80053 COMPREHEN METABOLIC PANEL: CPT

## 2023-07-21 PROCEDURE — 2580000003 HC RX 258: Performed by: PHYSICIAN ASSISTANT

## 2023-07-21 PROCEDURE — 83735 ASSAY OF MAGNESIUM: CPT

## 2023-07-21 PROCEDURE — 81003 URINALYSIS AUTO W/O SCOPE: CPT

## 2023-07-21 RX ORDER — 0.9 % SODIUM CHLORIDE 0.9 %
1000 INTRAVENOUS SOLUTION INTRAVENOUS ONCE
Status: COMPLETED | OUTPATIENT
Start: 2023-07-21 | End: 2023-07-21

## 2023-07-21 RX ADMIN — SODIUM CHLORIDE 1000 ML: 9 INJECTION, SOLUTION INTRAVENOUS at 20:42

## 2023-07-21 ASSESSMENT — PAIN SCALES - GENERAL
PAINLEVEL_OUTOF10: 3
PAINLEVEL_OUTOF10: 0
PAINLEVEL_OUTOF10: 3

## 2023-07-21 ASSESSMENT — PAIN - FUNCTIONAL ASSESSMENT
PAIN_FUNCTIONAL_ASSESSMENT: NONE - DENIES PAIN
PAIN_FUNCTIONAL_ASSESSMENT: 0-10

## 2023-07-23 LAB
EKG ATRIAL RATE: 84 BPM
EKG P AXIS: 69 DEGREES
EKG P-R INTERVAL: 156 MS
EKG Q-T INTERVAL: 354 MS
EKG QRS DURATION: 110 MS
EKG QTC CALCULATION (BAZETT): 418 MS
EKG R AXIS: 82 DEGREES
EKG T AXIS: 53 DEGREES
EKG VENTRICULAR RATE: 84 BPM

## 2023-07-23 PROCEDURE — 93010 ELECTROCARDIOGRAM REPORT: CPT | Performed by: INTERNAL MEDICINE

## 2024-09-24 ENCOUNTER — OFFICE VISIT (OUTPATIENT)
Dept: UROLOGY | Age: 24
End: 2024-09-24
Payer: COMMERCIAL

## 2024-09-24 VITALS — WEIGHT: 191 LBS | HEIGHT: 70 IN | RESPIRATION RATE: 20 BRPM | BODY MASS INDEX: 27.35 KG/M2

## 2024-09-24 DIAGNOSIS — R10.2 PERINEAL PAIN: Primary | ICD-10-CM

## 2024-09-24 DIAGNOSIS — L30.9: ICD-10-CM

## 2024-09-24 DIAGNOSIS — R39.89 URETHRAL PAIN: ICD-10-CM

## 2024-09-24 PROCEDURE — 99204 OFFICE O/P NEW MOD 45 MIN: CPT

## 2024-09-24 RX ORDER — AZITHROMYCIN 250 MG/1
TABLET, FILM COATED ORAL
Qty: 6 TABLET | Refills: 0 | Status: SHIPPED | OUTPATIENT
Start: 2024-09-24 | End: 2024-10-04

## 2024-09-24 RX ORDER — DOXYCYCLINE HYCLATE 100 MG
100 TABLET ORAL 2 TIMES DAILY
Qty: 42 TABLET | Refills: 0 | Status: SHIPPED | OUTPATIENT
Start: 2024-09-24 | End: 2024-10-15

## 2024-11-13 ENCOUNTER — OFFICE VISIT (OUTPATIENT)
Dept: UROLOGY | Age: 24
End: 2024-11-13
Payer: COMMERCIAL

## 2024-11-13 VITALS — BODY MASS INDEX: 27.63 KG/M2 | WEIGHT: 193 LBS | RESPIRATION RATE: 18 BRPM | HEIGHT: 70 IN

## 2024-11-13 DIAGNOSIS — L30.9: Primary | ICD-10-CM

## 2024-11-13 DIAGNOSIS — R39.89 URETHRAL PAIN: ICD-10-CM

## 2024-11-13 PROCEDURE — 99213 OFFICE O/P EST LOW 20 MIN: CPT

## 2024-11-13 NOTE — PROGRESS NOTES
Substance and Sexual Activity   Alcohol Use Not Currently    Comment: couple times a month       REVIEW OF SYSTEMS:  Constitutional: negative  Eyes: negative  Respiratory: negative  Cardiovascular: negative  Gastrointestinal: negative  Musculoskeletal: negative  Genitourinary: negative except for what is in HPI  Skin: negative   Neurological: negative  Hematological/Lymphatic: negative  Psychological: negative    Physical Exam:    This a 24 y.o. male   Vitals:    11/13/24 0931   Resp: 18       Constitutional: NAD, answering questions appropriately.  Neuro: Alert and oriented to person place and time.    Psych: Mood and affect normal.  Lungs: Non-labored respiration, no abnormal retractions or accessory muscle use.   Cardiovascular: Normal rate and regular rhythm.   Abdomen: Soft, non-tender, non-distended.  Genitourinary: Bladder non-distended.      Assessment and Plan   Penile Dermatitis  - Resolved with hydrocortisone 1% cream (OTC) and avoidance of skin irritants.   - Continue to avoid skin irritants (laundry detergent, dryer sheets, soaps). Hydrocortisone PRN.    Urethritis  Perineal Pain  - Doxycycline 100 mg BID x 21 days and Azithromycin (Z-Manjeet) taken to completion.  - Symptoms of urethritis and prostatitis now mostly resolved with Doxycycline and Azithromycin course. Mild residual symptoms but not overly bothersome.    *Follow-up in 6 months for symptom check then PRN if stable.    Skip Wang PA-C  Urology